# Patient Record
Sex: MALE | Race: WHITE | Employment: FULL TIME | URBAN - METROPOLITAN AREA
[De-identification: names, ages, dates, MRNs, and addresses within clinical notes are randomized per-mention and may not be internally consistent; named-entity substitution may affect disease eponyms.]

---

## 2017-02-20 ENCOUNTER — OFFICE VISIT (OUTPATIENT)
Dept: DERMATOLOGY | Facility: CLINIC | Age: 57
End: 2017-02-20

## 2017-02-20 VITALS — HEART RATE: 63 BPM | SYSTOLIC BLOOD PRESSURE: 130 MMHG | DIASTOLIC BLOOD PRESSURE: 82 MMHG

## 2017-02-20 DIAGNOSIS — Z13.0 SCREENING FOR DEFICIENCY ANEMIA: ICD-10-CM

## 2017-02-20 DIAGNOSIS — L30.0 NUMMULAR ECZEMA: Primary | ICD-10-CM

## 2017-02-20 DIAGNOSIS — Z11.59 NEED FOR HEPATITIS C SCREENING TEST: ICD-10-CM

## 2017-02-20 DIAGNOSIS — Z83.42 FAMILY HISTORY OF HYPERCHOLESTEROLEMIA: ICD-10-CM

## 2017-02-20 LAB
ANION GAP SERPL CALCULATED.3IONS-SCNC: 6 MMOL/L (ref 3–14)
BUN SERPL-MCNC: 17 MG/DL (ref 7–30)
CALCIUM SERPL-MCNC: 8.9 MG/DL (ref 8.5–10.1)
CHLORIDE SERPL-SCNC: 107 MMOL/L (ref 94–109)
CHOLEST SERPL-MCNC: 167 MG/DL
CO2 SERPL-SCNC: 27 MMOL/L (ref 20–32)
CREAT SERPL-MCNC: 0.84 MG/DL (ref 0.66–1.25)
ERYTHROCYTE [DISTWIDTH] IN BLOOD BY AUTOMATED COUNT: 13.2 % (ref 10–15)
GFR SERPL CREATININE-BSD FRML MDRD: NORMAL ML/MIN/1.7M2
GLUCOSE SERPL-MCNC: 86 MG/DL (ref 70–99)
HCT VFR BLD AUTO: 45.8 % (ref 40–53)
HCV AB SERPL QL IA: NORMAL
HDLC SERPL-MCNC: 40 MG/DL
HGB BLD-MCNC: 16.1 G/DL (ref 13.3–17.7)
LDLC SERPL CALC-MCNC: 112 MG/DL
MCH RBC QN AUTO: 34.5 PG (ref 26.5–33)
MCHC RBC AUTO-ENTMCNC: 35.2 G/DL (ref 31.5–36.5)
MCV RBC AUTO: 98 FL (ref 78–100)
NONHDLC SERPL-MCNC: 127 MG/DL
PLATELET # BLD AUTO: 226 10E9/L (ref 150–450)
POTASSIUM SERPL-SCNC: 4.5 MMOL/L (ref 3.4–5.3)
RBC # BLD AUTO: 4.66 10E12/L (ref 4.4–5.9)
SODIUM SERPL-SCNC: 141 MMOL/L (ref 133–144)
TRIGL SERPL-MCNC: 78 MG/DL
WBC # BLD AUTO: 6.6 10E9/L (ref 4–11)

## 2017-02-20 RX ORDER — CLOBETASOL PROPIONATE 0.5 MG/G
OINTMENT TOPICAL 2 TIMES DAILY
Qty: 180 G | Refills: 3 | Status: SHIPPED | OUTPATIENT
Start: 2017-02-20 | End: 2017-05-21

## 2017-02-20 ASSESSMENT — PAIN SCALES - GENERAL: PAINLEVEL: NO PAIN (0)

## 2017-02-20 NOTE — LETTER
2/20/2017       RE: Alvaro Partida  [address] [facility] [address]     Dear Colleague,    Thank you for referring your patient, Alvaro Partida, to the [facility] DERMATOLOGY at [facility]. Please see a copy of my visit note below.    Dermatology Problem list  1. Nummular dermatitis  2. Seborrheic keratoses  3. Lentigo      CHIEF COMPLAINT: Annual Skin Check.    SUBJECTIVE: Mr. Alvaro Partida is a 55 year old man who was [facility] for general skin examination on 2/8/16. Here today with his wife, Nona, for follow-up of nummular eczema and yearly skin check. States his eczema has been under good control, he has one active lesion today but states it is the only one he has had in some time. Currently using clobetasol cream once per week. Recently finished course of augmentin two weeks ago for sinusitis, otherwise feeling well. Denies chills, fever, recent weight loss. States he has been using sunblock over his scalp due to hair loss and presence of solar lentigo. He has no other areas of concern on his skin today.    OBJECTIVE:  On examination, the patient is a healthy middle aged man in no acute distress. He was alert and oriented x3. Complete skin examination was performed from scalp to toes including his scalp, face, eyes, lips, mouth, ears, neck, chest, abdomen, back, buttocks, and all 4 extremities. On the trunk and extremities, there were scattered benign appearing nevi and seborrheic keratoses. Scattered cherry hemangiomas over mid-back at level of the scapulae. Solar lentigo scattered over the vertex scalp and single focus over right cheek. Single 2-3 cm annular dermatitic erythematous plaque over medial right shin.      ASSESSMENT AND PLAN:  1. Nummular eczema.  - continue topical clobetasol as needed over involved areas (Patient uses sparingly and no evidence of atrophy)  - refill clobetasol.    2. Seborrheic keratoses. No  atypia    3. Benign appearing nevi on trunk and extremities without concerning features.    4. Lentigo of the scalp.  - continue to wear sunscreen on scalp and/or cover with hat    He will return in 1 year for repeat skin check.    Scribed by Kip Parra, MS4 for Dr. Retana    .The medical student acted as scribe for this encounter.  The encounter documented above was completely performed by myself.  Mary Lou Retana MD

## 2017-02-20 NOTE — MR AVS SNAPSHOT
After Visit Summary   2/20/2017    Alvaro Partida    MRN: [id number]           Patient Information     Date Of Birth          1960        Visit Information        Provider Department      2/20/2017 9:15 AM Mary Lou Retana MD [facility] Dermatology        Today's Diagnoses     Nummular eczema    -  1       Follow-ups after your visit        Follow-up notes from your care team     Return in about 1 year (around 2/20/2018).      Who to contact     Please call your clinic at [phone] to:    Ask questions about your health    Make or cancel appointments    Discuss your medicines    Learn about your test results    Speak to your doctor   If you have compliments or concerns about an experience at your clinic, or if you wish to file a complaint, please contact [facility] Patient Relations at [phone] or email us at [email or web address]         Additional Information About Your Visit        MyChart Information     [employer] gives you secure access to your electronic health record. If you see a primary care provider, you can also send messages to your care team and make appointments. If you have questions, please call your primary care clinic.  If you do not have a primary care provider, please call [phone] and they will assist you.      [employer] is an electronic gateway that provides easy, online access to your medical records. With [employer], you can request a clinic appointment, read your test results, renew a prescription or communicate with your care team.     To access your existing account, please contact your [facility] or call [phone] for assistance.        Care EveryWhere ID     This is your Care EveryWhere ID. This could be used by other organizations to access your [place] medical records  [id number]        Your Vitals Were     Pulse                   63            Blood Pressure from Last 3 Encounters:    02/20/17 130/82   11/08/16 110/75   08/20/13 129/84    Weight from Last 3 Encounters:   11/08/16 104.9 kg (231 lb 3.2 oz)   08/20/13 99.8 kg (220 lb)              Today, you had the following     No orders found for display         Today's Medication Changes          These changes are accurate as of: 2/20/17 12:56 PM.  If you have any questions, ask your nurse or doctor.               Start taking these medicines.        Dose/Directions    clobetasol 0.05 % ointment   Commonly known as:  TEMOVATE   Used for:  Nummular eczema   Started by:  Mary Lou Retana MD        Apply topically 2 times daily   Quantity:  180 g   Refills:  3            Where to get your medicines      These medications were sent to [facility] [address] [place] [address] [address]  AT [facility] [address] , [facility] [address]     Phone:  [phone]     clobetasol 0.05 % ointment                Primary Care Provider Office Phone # Fax #    Alex Mackenzie [phone]       [address] [address]        Thank you!     Thank you for choosing [facility] DERMATOLOGY  for your care. Our goal is always to provide you with excellent care. Hearing back from our patients is one way we can continue to improve our services. Please take a few minutes to complete the written survey that you may receive in the mail after your visit with us. Thank you!             Your Updated Medication List - Protect others around you: Learn how to safely use, store and throw away your medicines at www.disposemymeds.org.          This list is accurate as of: 2/20/17 12:56 PM.  Always use your most recent med list.                   Brand Name Dispense Instructions for use    ALEVE PO          ASPIRIN PO      Take 81 mg by mouth       clobetasol 0.05 % ointment    TEMOVATE    180 g    Apply topically 2 times daily       loratadine 10 MG tablet    CLARITIN     Take 10 mg by mouth daily       Multi-vitamin  Tabs tablet      Take 1 tablet by mouth daily       OMEGA-3 FISH OIL PO      Take 1 g by mouth daily

## 2017-02-20 NOTE — NURSING NOTE
Dermatology Rooming Note    Alvaro Partida's goals for this visit include:   Chief Complaint   Patient presents with     Skin Check     Skin check, Alvaro does not note any areas of concern     Jessica Campbell LPN

## 2017-02-20 NOTE — PROGRESS NOTES
Dermatology Problem list  1. Nummular dermatitis  2. Seborrheic keratoses  3. Lentigo      CHIEF COMPLAINT: Annual Skin Check.    SUBJECTIVE: Mr. Alvaro Partida is a 55 year old man who was last seen [facility] for general skin examination on 2/8/16. Here today with his wife, Nona, for follow-up of nummular eczema and yearly skin check. States his eczema has been under good control, he has one active lesion today but states it is the only one he has had in some time. Currently using clobetasol cream once per week. Recently finished course of augmentin two weeks ago for sinusitis, otherwise feeling well. Denies chills, fever, recent weight loss. States he has been using sunblock over his scalp due to hair loss and presence of solar lentigo. He has no other areas of concern on his skin today.    OBJECTIVE:  On examination, the patient is a healthy middle aged man in no acute distress. He was alert and oriented x3. Complete skin examination was performed from scalp to toes including his scalp, face, eyes, lips, mouth, ears, neck, chest, abdomen, back, buttocks, and all 4 extremities. On the trunk and extremities, there were scattered benign appearing nevi and seborrheic keratoses. Scattered cherry hemangiomas over mid-back at level of the scapulae. Solar lentigo scattered over the vertex scalp and single focus over right cheek. Single 2-3 cm annular dermatitic erythematous plaque over medial right shin.      ASSESSMENT AND PLAN:  1. Nummular eczema.  - continue topical clobetasol as needed over involved areas (Patient uses sparingly and no evidence of atrophy)  - refill clobetasol.    2. Seborrheic keratoses. No atypia    3. Benign appearing nevi on trunk and extremities without concerning features.    4. Lentigo of the scalp.  - continue to wear sunscreen on scalp and/or cover with hat    He will return in 1 year for repeat skin check.    Scribed by Kip Parra, MS4 for   [place]    .The medical student acted as scribe for this encounter.  The encounter documented above was completely performed by myself.  Mary Lou Retana MD

## 2017-11-12 ENCOUNTER — HEALTH MAINTENANCE LETTER (OUTPATIENT)
Age: 57
End: 2017-11-12

## 2018-03-05 ENCOUNTER — OFFICE VISIT (OUTPATIENT)
Dept: DERMATOLOGY | Facility: CLINIC | Age: 58
End: 2018-03-05
Payer: COMMERCIAL

## 2018-03-05 DIAGNOSIS — D48.5 NEOPLASM OF UNCERTAIN BEHAVIOR OF SKIN: ICD-10-CM

## 2018-03-05 DIAGNOSIS — L91.8 ACHROCHORDON: Primary | ICD-10-CM

## 2018-03-05 DIAGNOSIS — D18.01 CHERRY ANGIOMA: ICD-10-CM

## 2018-03-05 ASSESSMENT — PAIN SCALES - GENERAL
PAINLEVEL: NO PAIN (0)
PAINLEVEL: NO PAIN (0)

## 2018-03-05 NOTE — NURSING NOTE
Dermatology Rooming Note    Alvaro Partida's goals for this visit include:   Chief Complaint   Patient presents with     Derm Problem     Alvaro is here today for a skin check, states he has a mole on his penis that he's concerned about.       Mel Burton LPN

## 2018-03-05 NOTE — PATIENT INSTRUCTIONS

## 2018-03-05 NOTE — NURSING NOTE
Lidocaine-1 % injection   1mL once for one use, starting 3/5/2018 ending 3/5/2018,  2mL disp, R-0, injection  Injected by Dr. Thomson

## 2018-03-05 NOTE — LETTER
3/5/2018       RE: Alvaro Partida  [address] [facility] [address]     Dear Colleague,    Thank you for referring your patient, Alvaro Partida, to the [facility] DERMATOLOGY at [facility]. Please see a copy of my visit note below.    [facility] Dermatology Note      Dermatology Problem List:  1. Nummular dermatitis  2. Seborrheic keratoses  3. Lentigo  4. NUB, right anterior shoulder, r/o BCC biopsied 3/5/18 - results pending    Encounter Date: Mar 5, 2018    CC:   Chief Complaint   Patient presents with     Derm Problem     Alvaro is here today for a skin check, states he has a mole on his penis that he's concerned about.         History of Present Illness:  Mr. Alvaro Partida is a 57 year old male who presents as a follow-up for skin check. The patient was last seen 2/20/17 when skin exam was reassuring, he was given clobetasol PRN for nummular eczema which has remained well controlled. Today his only specific skin concern is regarding a brown bump on the dorsum of the shaft of his penis. Given his wife's history of melanoma they are very aware of any new spots that develop on eachother's skin. He thinks this bump has been present for the past few months. He denies any symptoms in relation to the lesion. He denies any other spots that are bleeding, tender or rapidly changing. No changes to his overall health. No other skin concerns today.    Past Medical History:   Patient Active Problem List   Diagnosis     Lichen simplex     Tubular adenoma of colon, 0.2cm [facility].     Seasonal allergic rhinitis due to pollen     Vasculogenic erectile dysfunction     Past Medical History:   Diagnosis Date     Elevated cholesterol      Seasonal allergies      Syncopal episodes 2006    EKG, CXR, CT, labs nl     Past Surgical History:   Procedure Laterality Date     SINUS SURGERY  5/2012    [place], [place]       Social History:  Is  .    Medications:  Current Outpatient Prescriptions   Medication Sig Dispense Refill     multivitamin, therapeutic with minerals (MULTI-VITAMIN) TABS Take 1 tablet by mouth daily       Omega-3 Fatty Acids (OMEGA-3 FISH OIL PO) Take 1 g by mouth daily       ASPIRIN PO Take 81 mg by mouth       loratadine (CLARITIN) 10 MG tablet Take 10 mg by mouth daily       Naproxen Sodium (ALEVE PO)           No Known Allergies      Review of Systems:  -As per HPI  -Constitutional: The patient denies fatigue, fevers, chills, unintended weight loss, and night sweats.  -HEENT: Patient denies nonhealing oral sores.  -Skin: As above in HPI. No additional skin concerns.    Physical exam:  Vitals: There were no vitals taken for this visit.  GEN: This is a well developed, well-nourished male in no acute distress, in a pleasant mood.    SKIN: Full skin, which includes the head/face, both arms, chest, back, abdomen,both legs, genitalia and/or groin buttocks, digits and/or nails, was examined.  - right anterior shoulder with a pink shiny papule with arborizing vessels under dermoscopy  - excoriated pink papulonodule on the right posterior arm  - on the dorsal surface of the shaft of the penis there is a medium brown pedunculated papule  - few well circumscribed medium brown evenly pigmented macules all <6mm on the torso and upper extremities  -No other lesions of concern on areas examined.     Impression/Plan:  1. Neoplasm of uncertain behavior on the right anterior shoulder. The differential diagnosis includes bcc.     Shave biopsy:  After discussion of benefits and risks including but not limited to bleeding/bruising, pain/swelling, infection, scar, incomplete removal, nerve damage/numbness, recurrence, and non-diagnostic biopsy, written consent, verbal consent and photographs were obtained. Time-out was performed. The area was cleaned with isopropyl alcohol.  was injected to obtain adequate anesthesia of the lesion on the right  anterior shouler. 2ml of 1% lidocaine was injected to obtain adequate anesthesia. A  shave biopsy was performed. Hemostasis was achieved with aluminium chloride. Vaseline and a sterile dressing were applied. The patient tolerated the procedure and no complications were noted. The patient was provided with verbal and written post care instructions.      Will call when results are available and to discuss next steps in treatment if necessary    2. Cherry angioma(s)    Reassured of benign etiology, no treatment indicated    3. Acrochordon, non irritated, shaft of penis    Reassured of benign etiology, no treatment indicated    4. Prurigo nodule, R posterior arm    Encouraged patient to not pick or scratch at this lesion, monitor for resolution    5. Benign nevi    Reviewed ABCDE's of melanoma and discussed sun protection      Follow-up in 1 year, earlier for new or changing lesions - sooner pending biopsy results      Dr. Retana staffed the patient.    Staff Involved:  Resident(Davin Thomson)/Staff(as above)  .I, Mary Lou Retana MD, saw this patient with the resident and agree with the resident s findings and plan of care as documented in the resident s note.  I was present for key portions of the procedure. KB            Pictures were placed in Pt's chart today for future reference.      Mary Lou Retana MD

## 2018-03-05 NOTE — PROGRESS NOTES
[facility] Dermatology Note      Dermatology Problem List:  1. Nummular dermatitis  2. Seborrheic keratoses  3. Lentigo  4. NUB, right anterior shoulder, r/o BCC biopsied 3/5/18 - results pending    Encounter Date: Mar 5, 2018    CC:   Chief Complaint   Patient presents with     Derm Problem     Alvaro is here today for a skin check, states he has a mole on his penis that he's concerned about.         History of Present Illness:  Mr. Alvaro Partida is a 57 year old male who presents as a follow-up for skin check. The patient was last seen 2/20/17 when skin exam was reassuring, he was given clobetasol PRN for nummular eczema which has remained well controlled. Today his only specific skin concern is regarding a brown bump on the dorsum of the shaft of his penis. Given his wife's history of melanoma they are very aware of any new spots that develop on eachother's skin. He thinks this bump has been present for the past few months. He denies any symptoms in relation to the lesion. He denies any other spots that are bleeding, tender or rapidly changing. No changes to his overall health. No other skin concerns today.    Past Medical History:   Patient Active Problem List   Diagnosis     Lichen simplex     Tubular adenoma of colon, 0.2cm [facility].     Seasonal allergic rhinitis due to pollen     Vasculogenic erectile dysfunction     Past Medical History:   Diagnosis Date     Elevated cholesterol      Seasonal allergies      Syncopal episodes 2006    EKG, CXR, CT, labs nl     Past Surgical History:   Procedure Laterality Date     SINUS SURGERY  5/2012    [place], [place]       Social History:  Is .    Medications:  Current Outpatient Prescriptions   Medication Sig Dispense Refill     multivitamin, therapeutic with minerals (MULTI-VITAMIN) TABS Take 1 tablet by mouth daily       Omega-3 Fatty Acids (OMEGA-3 FISH OIL PO) Take 1 g by mouth daily       ASPIRIN PO Take 81 mg by mouth        loratadine (CLARITIN) 10 MG tablet Take 10 mg by mouth daily       Naproxen Sodium (ALEVE PO)           No Known Allergies      Review of Systems:  -As per HPI  -Constitutional: The patient denies fatigue, fevers, chills, unintended weight loss, and night sweats.  -HEENT: Patient denies nonhealing oral sores.  -Skin: As above in HPI. No additional skin concerns.    Physical exam:  Vitals: There were no vitals taken for this visit.  GEN: This is a well developed, well-nourished male in no acute distress, in a pleasant mood.    SKIN: Full skin, which includes the head/face, both arms, chest, back, abdomen,both legs, genitalia and/or groin buttocks, digits and/or nails, was examined.  - right anterior shoulder with a pink shiny papule with arborizing vessels under dermoscopy  - excoriated pink papulonodule on the right posterior arm  - on the dorsal surface of the shaft of the penis there is a medium brown pedunculated papule  - few well circumscribed medium brown evenly pigmented macules all <6mm on the torso and upper extremities  -No other lesions of concern on areas examined.     Impression/Plan:  1. Neoplasm of uncertain behavior on the right anterior shoulder. The differential diagnosis includes bcc.     Shave biopsy:  After discussion of benefits and risks including but not limited to bleeding/bruising, pain/swelling, infection, scar, incomplete removal, nerve damage/numbness, recurrence, and non-diagnostic biopsy, written consent, verbal consent and photographs were obtained. Time-out was performed. The area was cleaned with isopropyl alcohol.  was injected to obtain adequate anesthesia of the lesion on the right anterior shouler. 2ml of 1% lidocaine was injected to obtain adequate anesthesia. A  shave biopsy was performed. Hemostasis was achieved with aluminium chloride. Vaseline and a sterile dressing were applied. The patient tolerated the procedure and no complications were noted. The patient was  provided with verbal and written post care instructions.      Will call when results are available and to discuss next steps in treatment if necessary    2. Cherry angioma(s)    Reassured of benign etiology, no treatment indicated    3. Acrochordon, non irritated, shaft of penis    Reassured of benign etiology, no treatment indicated    4. Prurigo nodule, R posterior arm    Encouraged patient to not pick or scratch at this lesion, monitor for resolution    5. Benign nevi    Reviewed ABCDE's of melanoma and discussed sun protection      Follow-up in 1 year, earlier for new or changing lesions - sooner pending biopsy results      Dr. Retana staffed the patient.    Staff Involved:  Resident(Davin Thomson)/Staff(as above)  .I, Mary Lou Retana MD, saw this patient with the resident and agree with the resident s findings and plan of care as documented in the resident s note.  I was present for key portions of the procedure. KB

## 2018-03-05 NOTE — MR AVS SNAPSHOT
After Visit Summary   3/5/2018    Alvaro Partida    MRN: [id number]           Patient Information     Date Of Birth          1960        Visit Information        Provider Department      3/5/2018 8:45 AM Mary Lou Retana MD [facility] Dermatology        Today's Diagnoses     Achrochordon    -  1    Cherry angioma        Neoplasm of uncertain behavior of skin          Care Instructions    Wound Care After a Biopsy    What is a skin biopsy?  A skin biopsy allows the doctor to examine a very small piece of tissue under the microscope to determine the diagnosis and the best treatment for the skin condition. A local anesthetic (numbing medicine)  is injected with a very small needle into the skin area to be tested. A small piece of skin is taken from the area. Sometimes a suture (stitch) is used.     What are the risks of a skin biopsy?  I will experience scar, bleeding, swelling, pain, crusting and redness. I may experience incomplete removal or recurrence. Risks of this procedure are excessive bleeding, bruising, infection, nerve damage, numbness, thick (hypertrophic or keloidal) scar and non-diagnostic biopsy.    How should I care for my wound for the first 24 hours?    Keep the wound dry and covered for 24 hours    If it bleeds, hold direct pressure on the area for 15 minutes. If bleeding does not stop then go to the emergency room    Avoid strenuous exercise the first 1-2 days or as your doctor instructs you    How should I care for the wound after 24 hours?    After 24 hours, remove the bandage    You may bathe or shower as normal    If you had a scalp biopsy, you can shampoo as usual and can use shower water to clean the biopsy site daily    Clean the wound twice a day with gentle soap and water    Do not scrub, be gentle    Apply white petroleum/Vaseline after cleaning the wound with a cotton swab or a clean finger, and keep the site covered with a Bandaid /bandage. Bandages are not  necessary with a scalp biopsy    If you are unable to cover the site with a Bandaid /bandage, re-apply ointment 2-3 times a day to keep the site moist. Moisture will help with healing    Avoid strenuous activity for first 1-2 days    Avoid lakes, rivers, pools, and oceans until the stitches are removed or the site is healed    How do I clean my wound?    Wash hands thoroughly with soap or use hand  before all wound care    Clean the wound with gentle soap and water    Apply white petroleum/Vaseline  to wound after it is clean    Replace the Bandaid /bandage to keep the wound covered for the first few days or as instructed by your doctor    If you had a scalp biopsy, warm shower water to the area on a daily basis should suffice    What should I use to clean my wound?     Cotton-tipped applicators (Qtips )    White petroleum jelly (Vaseline ). Use a clean new container and use Q-tips to apply.    Bandaids   as needed    Gentle soap     How should I care for my wound long term?    Do not get your wound dirty    Keep up with wound care for one week or until the area is healed.    A small scab will form and fall off by itself when the area is completely healed. The area will be red and will become pink in color as it heals. Sun protection is very important for how your scar will turn out. Sunscreen with an SPF 30 or greater is recommended once the area is healed.    You should have some soreness but it should be mild and slowly go away over several days. Talk to your doctor about using tylenol for pain,    When should I call my doctor?  If you have increased:     Pain or swelling    Pus or drainage (clear or slightly yellow drainage is ok)    Temperature over 100F    Spreading redness or warmth around wound    When will I hear about my results?  The biopsy results can take 2-3 weeks to come back. The clinic will call you with the results, send you a Vitrina message, or have you schedule a follow-up clinic or  phone time to discuss the results. Contact our clinics if you do not hear from us in 3 weeks.     Who should I call with questions?    [facility]: [phone]     [facility]: [phone]    For urgent needs outside of business hours call the [facility] at [phone] and ask for the dermatology resident on call              Follow-ups after your visit        Follow-up notes from your care team     Return in about 1 year (around 3/5/2019), or sooner pending biopsy results.      Who to contact     Please call your clinic at [phone] to:    Ask questions about your health    Make or cancel appointments    Discuss your medicines    Learn about your test results    Speak to your doctor            Additional Information About Your Visit        [employer] Information     [employer] gives you secure access to your electronic health record. If you see a primary care provider, you can also send messages to your care team and make appointments. If you have questions, please call your primary care clinic.  If you do not have a primary care provider, please call [phone] and they will assist you.      [employer] is an electronic gateway that provides easy, online access to your medical records. With [employer], you can request a clinic appointment, read your test results, renew a prescription or communicate with your care team.     To access your existing account, please contact your [facility] or call [phone] for assistance.        Care EveryWhere ID     This is your Care EveryWhere ID. This could be used by other organizations to access your [place] medical records  [id number]         Blood Pressure from Last 3 Encounters:   02/20/17 130/82   11/08/16 110/75   08/20/13 129/84    Weight from Last 3 Encounters:   11/08/16 104.9 kg (231 lb 3.2 oz)   08/20/13 99.8 kg (220 lb)              We Performed the Following      BIOPSY SKIN/SUBQ/MUC MEM, SINGLE LESION     Surgical pathology exam        Primary Care Provider Office Phone # Fax #    Alex Mackenzie [phone]       [facility] [address] [place] [address]        Equal Access to Services     ELLIE DOVER : Hadrocio hardin hadfabiolao Soomaali, waaxda luqadaha, qaybta kaalmada adeegyada, juan kapilin hayaan fortunatodeena zepeda katlyn weller. So [facility] [phone].    ATENCIÓN: Si habla español, tiene a jules disposición servicios gratuitos de asistencia lingüística. Llame al [phone].    We comply with applicable federal civil rights laws and [place] laws. We do not discriminate on the basis of race, color, national origin, age, disability, sex, sexual orientation, or gender identity.            Thank you!     Thank you for choosing [facility]ER[facility]  for your care. Our goal is always to provide you with excellent care. Hearing back from our patients is one way we can continue to improve our services. Please take a few minutes to complete the written survey that you may receive in the mail after your visit with us. Thank you!             Your Updated Medication List - Protect others around you: Learn how to safely use, store and throw away your medicines at www.disposemymeds.org.          This list is accurate as of 3/5/18  9:31 AM.  Always use your most recent med list.                   Brand Name Dispense Instructions for use Diagnosis    ALEVE PO           ASPIRIN PO      Take 81 mg by mouth        loratadine 10 MG tablet    CLARITIN     Take 10 mg by mouth daily        Multi-vitamin Tabs tablet      Take 1 tablet by mouth daily        OMEGA-3 FISH OIL PO      Take 1 g by mouth daily

## 2018-03-08 LAB — COPATH REPORT: NORMAL

## 2019-02-24 ENCOUNTER — DOCUMENTATION ONLY (OUTPATIENT)
Dept: CARE COORDINATION | Facility: CLINIC | Age: 59
End: 2019-02-24

## 2019-04-03 ENCOUNTER — OFFICE VISIT (OUTPATIENT)
Dept: DERMATOLOGY | Facility: CLINIC | Age: 59
End: 2019-04-03
Payer: COMMERCIAL

## 2019-04-03 DIAGNOSIS — L82.1 SK (SEBORRHEIC KERATOSIS): ICD-10-CM

## 2019-04-03 DIAGNOSIS — L57.0 AK (ACTINIC KERATOSIS): ICD-10-CM

## 2019-04-03 DIAGNOSIS — D48.5 NEOPLASM OF UNCERTAIN BEHAVIOR OF SKIN: Primary | ICD-10-CM

## 2019-04-03 RX ORDER — FLUTICASONE PROPIONATE 50 MCG
SPRAY, SUSPENSION (ML) NASAL
Refills: 1 | COMMUNITY
Start: 2018-10-26

## 2019-04-03 RX ORDER — CETIRIZINE HYDROCHLORIDE 10 MG/1
10 TABLET ORAL
COMMUNITY

## 2019-04-03 ASSESSMENT — PAIN SCALES - GENERAL: PAINLEVEL: NO PAIN (0)

## 2019-04-03 NOTE — PROGRESS NOTES
[facility] Dermatology Note      Dermatology Problem List:  1. Actinic Keratosis of left temporal region  -s/p cryotherapy 4/3/2019   2. Seborrheic keratoses  3. Lentigo  4. NUB, right anterior shoulder, r/o BCC biopsied 3/5/18 - benign, suggestive of ruptured folliculitis  5 NUB, left forearm biopsy 4/3/2019    Encounter Date: Apr 3, 2019    CC:  No chief complaint on file.      History of Present Illness:  Mr. Alvaro Partida is a 58 year old male who presents as a follow-up for a skin check. The patient was last seen 3/5/18 when an NUB on the right anterior shoulder was biopsied and returned benign, suggestive of ruptured folliculitis. He also was found to have an acrochordon on the shaft of his penis. Today, he has concern about two new lesions on his face and scalp. His wife, who is currently in treatment for stage 4 melanoma, is worried about some new brown patches on his neck. He has no family history of skin or pancreatic cancer. He uses sun protection including hats and sun screen regularly. He has otherwise been healthy for the past year and has no other skin concerns today.      Past Medical History:   Patient Active Problem List   Diagnosis     Lichen simplex     Tubular adenoma of colon, 0.2cm [facility].     Seasonal allergic rhinitis due to pollen     Vasculogenic erectile dysfunction     Past Medical History:   Diagnosis Date     Elevated cholesterol      Seasonal allergies      Syncopal episodes 2006    EKG, CXR, CT, labs nl     Past Surgical History:   Procedure Laterality Date     SINUS SURGERY  5/2012    [place], [place]       Social History:  Patient reports that  has never smoked. he has never used smokeless tobacco.    Family History:  Family History   Problem Relation Age of Onset     Alzheimer Disease Father      Breast Cancer Mother      Hypertension Mother      Diabetes Mother      Alzheimer Disease Mother 78     Sleep Apnea Mother      Sleep Apnea Sister       Sleep Apnea Sister      Sleep Apnea Brother      Cancer No family hx of         no family hx of skin cancers     Skin Cancer No family hx of      Melanoma No family hx of        Medications:  Current Outpatient Medications   Medication Sig Dispense Refill     ASPIRIN PO Take 81 mg by mouth       loratadine (CLARITIN) 10 MG tablet Take 10 mg by mouth daily       multivitamin, therapeutic with minerals (MULTI-VITAMIN) TABS Take 1 tablet by mouth daily       Naproxen Sodium (ALEVE PO)        Omega-3 Fatty Acids (OMEGA-3 FISH OIL PO) Take 1 g by mouth daily       No Known Allergies      Review of Systems:  -As per HPI  -Constitutional: Otherwise feeling well today, in usual state of health.  -Skin: As above in HPI. No additional skin concerns.    Physical exam:  Vitals: There were no vitals taken for this visit.  GEN: This is a well developed, well-nourished male in no acute distress, in a pleasant mood.    SKIN: Full skin, which includes the head/face, both arms, chest, back, abdomen,both legs, genitalia and/or groin buttocks, digits and/or nails, was examined.  - left forearm pigmented papule with irregular borders and coloring  - lentigines throughout the torso   - 2 red gritty papules on the left temporal region  - lentigo of the left temporal region and right cheek   - one scaly pink plaque on the right posterior ankle   - few well circumscribed medium brown evenly pigmented macules all <6mm on the torso and upper extremities  - multiple cherry angiomas on the torso  -seborrheic keratosis on the left neck, one on the back   -No other lesions of concern on areas examined.     Impression/Plan:  1. Actinic Keratosis x 2  of the left temporal region  - pre-cancerous potential of the lesion discussed    - Cryotherapy procedure note (performed by faculty): After verbal consent and discussion of risks and benefits including but no limited to dyspigmentation/scar, blister, infection, recurrence,left temporal area  was(were) treated with 1-2mm freeze border for 2 cycles with liquid nitrogen. Post cryotherapy instructions were provided.     2. Neoplasm of uncertain behavior on the left forearm. The differential diagnosis includes benign nevi vs. Melanoma.     - Shave biopsy(performed by faculty): After discussion of benefits and risks including but not limited to bleeding, infection, scar, incomplete removal, recurrence, and non-diagnostic biopsy, written consent and photographs were obtained. Time-out was performed. The area was cleaned with isopropyl alcohol. 5mL of 1% lidocaine with 1:100,000 epinephrine was injected to obtain adequate anesthesia of the lesion on the left forearm. A shave biopsy was performed. Hemostasis was achieved with aluminium chloride. Vaseline and a sterile dressing were applied. The patient tolerated the procedure and no complications were noted. The patient was provided with verbal and written post care instructions.   - Will call with biopsy results when available    3. Seborrheic keratosis    - Reassured of benign etiology, no treatment indicated    4. Psoriasis   - continue to use at home clobetasol as needed.     CC Referred Self, MD  No address on file on close of this encounter.  Follow-up in 1 year, earlier for new or changing lesions.     Staff Involved:  I, Murray Ch MS4, saw and examined the patient in the presence of Dr. Retana.    Murray Ch, MS4  [employer] [place] [employer]        .I was present with the medical student who participated in the service and in the documentation of the note. I have verified the history and personally performed the physical exam and medical decision making. I agree with the assessment and plan of care as documented in the note.  Mary Lou Retana MD

## 2019-04-03 NOTE — PATIENT INSTRUCTIONS
Cryotherapy    What is it?    Use of a very cold liquid, such as liquid nitrogen, to freeze and destroy abnormal skin cells that need to be removed    What should I expect?    Tenderness and redness    A small blister that might grow and fill with dark purple blood. There may be crusting.    More than one treatment may be needed if the lesions do not go away.    How do I care for the treated area?    Gently wash the area with your hands when bathing.    Use a thin layer of Vaseline to help with healing. You may use a Band-Aid.     The area should heal within 7-10 days and may leave behind a pink or lighter color.     Do not use an antibiotic or Neosporin ointment.     You may take acetaminophen (Tylenol) for pain.     Call your Doctor if you have:    Severe pain    Signs of infection (warmth, redness, cloudy yellow drainage, and or a bad smell)    Questions or concerns    Who should I call with questions?       [facility]: [phone]       [facility]: [phone]       For urgent needs outside of business hours call the [facility] at [phone]        and ask for the dermatology resident on call        Wound Care After a Biopsy    What is a skin biopsy?  A skin biopsy allows the doctor to examine a very small piece of tissue under the microscope to determine the diagnosis and the best treatment for the skin condition. A local anesthetic (numbing medicine)  is injected with a very small needle into the skin area to be tested. A small piece of skin is taken from the area. Sometimes a suture (stitch) is used.     What are the risks of a skin biopsy?  I will experience scar, bleeding, swelling, pain, crusting and redness. I may experience incomplete removal or recurrence. Risks of this procedure are excessive bleeding, bruising, infection, nerve damage, numbness, thick (hypertrophic or keloidal) scar and non-diagnostic biopsy.    How should I  care for my wound for the first 24 hours?    Keep the wound dry and covered for 24 hours    If it bleeds, hold direct pressure on the area for 15 minutes. If bleeding does not stop then go to the emergency room    Avoid strenuous exercise the first 1-2 days or as your doctor instructs you    How should I care for the wound after 24 hours?    After 24 hours, remove the bandage    You may bathe or shower as normal    If you had a scalp biopsy, you can shampoo as usual and can use shower water to clean the biopsy site daily    Clean the wound twice a day with gentle soap and water    Do not scrub, be gentle    Apply white petroleum/Vaseline after cleaning the wound with a cotton swab or a clean finger, and keep the site covered with a Bandaid /bandage. Bandages are not necessary with a scalp biopsy    If you are unable to cover the site with a Bandaid /bandage, re-apply ointment 2-3 times a day to keep the site moist. Moisture will help with healing    Avoid strenuous activity for first 1-2 days    Avoid lakes, rivers, pools, and oceans until the stitches are removed or the site is healed    How do I clean my wound?    Wash hands thoroughly with soap or use hand  before all wound care    Clean the wound with gentle soap and water    Apply white petroleum/Vaseline  to wound after it is clean    Replace the Bandaid /bandage to keep the wound covered for the first few days or as instructed by your doctor    If you had a scalp biopsy, warm shower water to the area on a daily basis should suffice    What should I use to clean my wound?     Cotton-tipped applicators (Qtips )    White petroleum jelly (Vaseline ). Use a clean new container and use Q-tips to apply.    Bandaids   as needed    Gentle soap     How should I care for my wound long term?    Do not get your wound dirty    Keep up with wound care for one week or until the area is healed.    A small scab will form and fall off by itself when the area is  completely healed. The area will be red and will become pink in color as it heals. Sun protection is very important for how your scar will turn out. Sunscreen with an SPF 30 or greater is recommended once the area is healed.    If you have stitches, stitches need to be removed in 14 days. You may return to our clinic for this or you may have it done locally at your doctor s office.    You should have some soreness but it should be mild and slowly go away over several days. Talk to your doctor about using tylenol for pain,    When should I call my doctor?  If you have increased:     Pain or swelling    Pus or drainage (clear or slightly yellow drainage is ok)    Temperature over 100F    Spreading redness or warmth around wound    When will I hear about my results?  The biopsy results can take 2-3 weeks to come back. The clinic will call you with the results, send you a [employer] message, or have you schedule a follow-up clinic or phone time to discuss the results. Contact our clinics if you do not hear from us in 3 weeks.     Who should I call with questions?    [facility]: [phone]     [facility]: [phone]    For urgent needs outside of business hours call the [facility] at [phone] and ask for the dermatology resident on call

## 2019-04-03 NOTE — LETTER
4/3/2019       RE: Alvaro Partida  [address] [facility] [address]     Dear Colleague,    Thank you for referring your patient, Alvaro Partida, to the [facility] DERMATOLOGY at [facility]. Please see a copy of my visit note below.    [facility] Dermatology Note      Dermatology Problem List:  1. Actinic Keratosis of left temporal region  -s/p cryotherapy 4/3/2019   2. Seborrheic keratoses  3. Lentigo  4. NUB, right anterior shoulder, r/o BCC biopsied 3/5/18 - benign, suggestive of ruptured folliculitis  5 NUB, left forearm biopsy 4/3/2019    Encounter Date: Apr 3, 2019    CC:  No chief complaint on file.      History of Present Illness:  Mr. Alvaro Partida is a 58 year old male who presents as a follow-up for  a skin check. The patient was last seen  3/5/18 when  an NUB on the right anterior shoulder was biopsied and returned benign, suggestive of ruptured folliculitis. He also was found to have an acrochordon on the shaft of his penis. Today, he has concern about two new lesions on his face and scalp. His wife, who is currently in treatment for stage 4 melanoma, is worried about some new brown patches on his neck. He has no family history of skin or pancreatic cancer. He uses sun protection including hats and sun screen regularly. He has otherwise been healthy for the past year and has no other skin concerns today.      Past Medical History:   Patient Active Problem List   Diagnosis     Lichen simplex     Tubular adenoma of colon, 0.2cm [facility].     Seasonal allergic rhinitis due to pollen     Vasculogenic erectile dysfunction     Past Medical History:   Diagnosis Date     Elevated cholesterol      Seasonal allergies      Syncopal episodes 2006    EKG, CXR, CT, labs nl     Past Surgical History:   Procedure Laterality Date     SINUS SURGERY  5/2012    [place], [place]       Social History:  Patient reports that  has never smoked. he has  never used smokeless tobacco.    Family History:  Family History   Problem Relation Age of Onset     Alzheimer Disease Father      Breast Cancer Mother      Hypertension Mother      Diabetes Mother      Alzheimer Disease Mother 78     Sleep Apnea Mother      Sleep Apnea Sister      Sleep Apnea Sister      Sleep Apnea Brother      Cancer No family hx of         no family hx of skin cancers     Skin Cancer No family hx of      Melanoma No family hx of        Medications:  Current Outpatient Medications   Medication Sig Dispense Refill     ASPIRIN PO Take 81 mg by mouth       loratadine (CLARITIN) 10 MG tablet Take 10 mg by mouth daily       multivitamin, therapeutic with minerals (MULTI-VITAMIN) TABS Take 1 tablet by mouth daily       Naproxen Sodium (ALEVE PO)        Omega-3 Fatty Acids (OMEGA-3 FISH OIL PO) Take 1 g by mouth daily       No Known Allergies      Review of Systems:  -As per HPI  -Constitutional: Otherwise feeling well today, in usual state of health.  -Skin: As above in HPI. No additional skin concerns.    Physical exam:  Vitals: There were no vitals taken for this visit.  GEN: This is a well developed, well-nourished male in no acute distress, in a pleasant mood.    SKIN: Full skin, which includes the head/face, both arms, chest, back, abdomen,both legs, genitalia and/or groin buttocks, digits and/or nails, was examined.  - left forearm pigmented papule with irregular borders and coloring  - lentigines throughout the torso   - 2 red gritty papules on the left temporal region  - lentigo of the left temporal region and right cheek   - one scaly pink plaque on the right posterior ankle   - few well circumscribed medium brown evenly pigmented macules all <6mm on the torso and upper extremities  - multiple cherry angiomas on the torso  -seborrheic keratosis on the left neck, one on the back   -No other lesions of concern on areas examined.     Impression/Plan:  1. Actinic Keratosis x 2  of the left  temporal region  - pre-cancerous potential of the lesion discussed    - Cryotherapy procedure note (performed by faculty): After verbal consent and discussion of risks and benefits including but no limited to dyspigmentation/scar, blister, infection, recurrence,left temporal area was(were) treated with 1-2mm freeze border for 2 cycles with liquid nitrogen. Post cryotherapy instructions were provided.     2. Neoplasm of uncertain behavior on the left forearm. The differential diagnosis includes benign nevi vs. Melanoma.     - Shave biopsy(performed by faculty): After discussion of benefits and risks including but not limited to bleeding, infection, scar, incomplete removal, recurrence, and non-diagnostic biopsy, written consent and photographs were obtained. Time-out was performed. The area was cleaned with isopropyl alcohol. 5mL of 1% lidocaine with 1:100,000 epinephrine was injected to obtain adequate anesthesia of the lesion on the left forearm. A shave biopsy was performed. Hemostasis was achieved with aluminium chloride. Vaseline and a sterile dressing were applied. The patient tolerated the procedure and no complications were noted. The patient was provided with verbal and written post care instructions.   - Will call with biopsy results when available    3. Seborrheic keratosis    - Reassured of benign etiology, no treatment indicated    4. Psoriasis   - continue to use at home clobetasol as needed.     CC Referred Self, MD  No address on file on close of this encounter.  Follow-up in 1 year, earlier for new or changing lesions.     Staff Involved:  I, Murray Ch MS4, saw and examined the patient in the presence of Dr. Retana.    Murray Ch, MS4  [employer]        .I was present with the medical student who participated in the service and in the documentation of the note. I have verified the history and personally performed the physical exam and medical decision making. I agree  with the assessment and plan of care as documented in the note.      Mary Lou Retana MD

## 2019-04-03 NOTE — NURSING NOTE
Dermatology Rooming Note    Alvaro Partida's goals for this visit include:   Chief Complaint   Patient presents with     Derm Problem     Don is here for a skin check, states concerning areas on his face, scalp, and neck.       Mel Burton LPN

## 2019-04-04 LAB — COPATH REPORT: NORMAL

## 2020-03-02 ENCOUNTER — HEALTH MAINTENANCE LETTER (OUTPATIENT)
Age: 60
End: 2020-03-02

## 2020-04-29 ENCOUNTER — TELEPHONE (OUTPATIENT)
Dept: DERMATOLOGY | Facility: CLINIC | Age: 60
End: 2020-04-29

## 2020-04-29 NOTE — TELEPHONE ENCOUNTER
Called and spoke with patient. He is agreeable to change visit to a telephone and photos have already been submitted. Patient verbalized understanding and has no further questions at this time.

## 2020-05-06 ENCOUNTER — OFFICE VISIT (OUTPATIENT)
Dept: DERMATOLOGY | Facility: CLINIC | Age: 60
End: 2020-05-06
Payer: COMMERCIAL

## 2020-05-06 DIAGNOSIS — D18.01 CHERRY ANGIOMA: ICD-10-CM

## 2020-05-06 DIAGNOSIS — D48.5 NEOPLASM OF UNCERTAIN BEHAVIOR OF SKIN: ICD-10-CM

## 2020-05-06 DIAGNOSIS — D48.9 NEOPLASM OF UNCERTAIN BEHAVIOR: ICD-10-CM

## 2020-05-06 DIAGNOSIS — L82.1 SK (SEBORRHEIC KERATOSIS): ICD-10-CM

## 2020-05-06 DIAGNOSIS — Q82.8 POROKERATOSIS: ICD-10-CM

## 2020-05-06 DIAGNOSIS — L57.0 AK (ACTINIC KERATOSIS): Primary | ICD-10-CM

## 2020-05-06 DIAGNOSIS — L82.1 SEBORRHEIC KERATOSES: ICD-10-CM

## 2020-05-06 RX ORDER — ROSUVASTATIN CALCIUM 5 MG/1
TABLET, COATED ORAL
COMMUNITY
Start: 2019-07-01

## 2020-05-06 ASSESSMENT — PAIN SCALES - GENERAL
PAINLEVEL: NO PAIN (0)
PAINLEVEL: NO PAIN (1)

## 2020-05-06 NOTE — PROGRESS NOTES
[facility] Dermatology Note      Dermatology Problem List:  1. Actinic Keratosis of left temporal region   -s/p cryotherapy 4/3/2019   2. Ruptured folliculitis, right anterior shoulder, biopsied 3/5/18  3. Jxnl nevus, left forearm, biopsy 4/3/2019  4. NUB, right dorsal arm, bx 5/6/20  Social: wife in treatment for stage 4 melanoma (dx 10 yrs ago)    Encounter Date: May 6, 2020    CC:  Chief Complaint   Patient presents with     Derm Problem     Don is here for a skin check, has concerning areas on his right forearm and right side of collar bone.        History of Present Illness:  Mr. Alvaro Partida is a 59 year old male who presents as a follow-up for a skin check. The patient was last seen 4/29/19 when an NUB on the left forearm was biopsied and returned as a junctional nevus. Today he has several lesions of concern including on the right forearm and right clavicle. These spots are asx. He notes that there is also an area on the L ankle that he applies clobetasol to. He has one last lesion on the right posterior arm that has been present for a year. His wife will squeeze it and it drains a fluid but never goes away. He cosme not have a h/o skin cancer. He does use sup protection regularly. Since his last visit his health has been stable. He has no other skin concerns today.     Past Medical History:   Patient Active Problem List   Diagnosis     Lichen simplex     Tubular adenoma of colon, 0.2cm [facility].     Seasonal allergic rhinitis due to pollen     Vasculogenic erectile dysfunction     Past Medical History:   Diagnosis Date     Elevated cholesterol      Seasonal allergies      Syncopal episodes 2006    EKG, CXR, CT, labs nl     Past Surgical History:   Procedure Laterality Date     SINUS SURGERY  5/2012    [place], [place]       Social History:  Patient reports that he has never smoked. He has never used smokeless tobacco.    Family History:  Family History   Problem Relation  Age of Onset     Alzheimer Disease Father      Breast Cancer Mother      Hypertension Mother      Diabetes Mother      Alzheimer Disease Mother 78     Sleep Apnea Mother      Sleep Apnea Sister      Sleep Apnea Sister      Sleep Apnea Brother      Cancer No family hx of         no family hx of skin cancers     Skin Cancer No family hx of      Melanoma No family hx of        Medications:  Current Outpatient Medications   Medication Sig Dispense Refill     ASPIRIN PO Take 81 mg by mouth       cetirizine (ZYRTEC) 10 MG tablet Take 10 mg by mouth       fluticasone (FLONASE) 50 MCG/ACT nasal spray   1     loratadine (CLARITIN) 10 MG tablet Take 10 mg by mouth daily       multivitamin, therapeutic with minerals (MULTI-VITAMIN) TABS Take 1 tablet by mouth daily       Naproxen Sodium (ALEVE PO)        Omega-3 Fatty Acids (OMEGA-3 FISH OIL PO) Take 1 g by mouth daily       No Known Allergies      Review of Systems:  -As per HPI  -Constitutional: Otherwise feeling well today, in usual state of health.  -Skin: As above in HPI. No additional skin concerns.    Physical exam:  Vitals: There were no vitals taken for this visit.  GEN: This is a well developed, well-nourished male in no acute distress, in a pleasant mood.    SKIN: Full skin, which includes the head/face, both arms, chest, back, abdomen,both legs, genitalia and/or groin buttocks, digits and/or nails, was examined.  - left and right forearms with symmetric tan stuck on appearing papules without pig network  - 4 pink gritty papules on the left scalp   - ~1cm light brown patch of the left temple  - single pink 2cm plaque with rim of scale and normal skin centrally on the right posterior ankle   - multiple domed red papules on the torso  - dark brown pedunculated 1mm papule on dorsal penile shaft  - 3mm pink papule with overlying white sale on the right dorsal arm  - No other lesions of concern on areas examined.     Impression/Plan:  1. Actinic Keratosis x 4  of the  left scalp  - pre-cancerous potential of the lesion discussed    - Cryotherapy procedure note (performed by faculty): After verbal consent and discussion of risks and benefits including but no limited to dyspigmentation/scar, blister, infection, recurrence,left temporal area was(were) treated with 1-2mm freeze border for 2 cycles with liquid nitrogen. Post cryotherapy instructions were provided.     2. NUB, right dorsal arm, bx 5/6/20, The differential diagnosis includes folliculitis vs EIC vs other     - Shave biopsy: After discussion of benefits and risks including but not limited to bleeding, infection, scar, incomplete removal, recurrence, and non-diagnostic biopsy, written consent and photographs were obtained. Time-out was performed. The area was cleaned with isopropyl alcohol. 5mL of 1% lidocaine with 1:100,000 epinephrine was injected to obtain adequate anesthesia of the lesion on the left forearm. A shave biopsy was performed. Hemostasis was achieved with aluminium chloride. Vaseline and a sterile dressing were applied. The patient tolerated the procedure and no complications were noted. The patient was provided with verbal and written post care instructions.   - Will call with biopsy results when available    3. Seborrheic keratosis and cherry angiomata  - Reassured of benign etiology, no treatment indicated    4. Possible porokeratosis of the L post ankle   - continue to use at home clobetasol as needed.     CC Referred Self, MD  No address on file on close of this encounter.    Follow-up in 1 year, earlier for new or changing lesions.     Staff Involved:  Resident (Isela Turner MD) / Staff (as above)  .I was present for key portions of the biopsy and the entire cryotherapy procedure. Mary Lou Retana MD  I, Mary Lou Retana MD, saw this patient with the resident and agree with the resident s findings and plan of care as documented in the resident s note.

## 2020-05-06 NOTE — PATIENT INSTRUCTIONS
Wound Care After a Biopsy    What is a skin biopsy?  A skin biopsy allows the doctor to examine a very small piece of tissue under the microscope to determine the diagnosis and the best treatment for the skin condition. A local anesthetic (numbing medicine)  is injected with a very small needle into the skin area to be tested. A small piece of skin is taken from the area. Sometimes a suture (stitch) is used.     What are the risks of a skin biopsy?  I will experience scar, bleeding, swelling, pain, crusting and redness. I may experience incomplete removal or recurrence. Risks of this procedure are excessive bleeding, bruising, infection, nerve damage, numbness, thick (hypertrophic or keloidal) scar and non-diagnostic biopsy.    How should I care for my wound for the first 24 hours?    Keep the wound dry and covered for 24 hours    If it bleeds, hold direct pressure on the area for 15 minutes. If bleeding does not stop then go to the emergency room    Avoid strenuous exercise the first 1-2 days or as your doctor instructs you    How should I care for the wound after 24 hours?    After 24 hours, remove the bandage    You may bathe or shower as normal    If you had a scalp biopsy, you can shampoo as usual and can use shower water to clean the biopsy site daily    Clean the wound twice a day with gentle soap and water    Do not scrub, be gentle    Apply white petroleum/Vaseline after cleaning the wound with a cotton swab or a clean finger, and keep the site covered with a Bandaid /bandage. Bandages are not necessary with a scalp biopsy    If you are unable to cover the site with a Bandaid /bandage, re-apply ointment 2-3 times a day to keep the site moist. Moisture will help with healing    Avoid strenuous activity for first 1-2 days    Avoid lakes, rivers, pools, and oceans until the stitches are removed or the site is healed    How do I clean my wound?    Wash hands thoroughly with soap or use hand  before all  wound care    Clean the wound with gentle soap and water    Apply white petroleum/Vaseline  to wound after it is clean    Replace the Bandaid /bandage to keep the wound covered for the first few days or as instructed by your doctor    If you had a scalp biopsy, warm shower water to the area on a daily basis should suffice    What should I use to clean my wound?     Cotton-tipped applicators (Qtips )    White petroleum jelly (Vaseline ). Use a clean new container and use Q-tips to apply.    Bandaids   as needed    Gentle soap     How should I care for my wound long term?    Do not get your wound dirty    Keep up with wound care for one week or until the area is healed.    A small scab will form and fall off by itself when the area is completely healed. The area will be red and will become pink in color as it heals. Sun protection is very important for how your scar will turn out. Sunscreen with an SPF 30 or greater is recommended once the area is healed.    If you have stitches, stitches need to be removed in 14 days. You may return to our clinic for this or you may have it done locally at your doctor s office.    You should have some soreness but it should be mild and slowly go away over several days. Talk to your doctor about using tylenol for pain,    When should I call my doctor?  If you have increased:     Pain or swelling    Pus or drainage (clear or slightly yellow drainage is ok)    Temperature over 100F    Spreading redness or warmth around wound    When will I hear about my results?  The biopsy results can take 2-3 weeks to come back. The clinic will call you with the results, send you a hopscoutt message, or have you schedule a follow-up clinic or phone time to discuss the results. Contact our clinics if you do not hear from us in 3 weeks.     Who should I call with questions?    [facility]: [phone]     [facility]: [phone]    For  urgent needs outside of business hours call the [facility] at [phone] and ask for the dermatology resident on call    Cryotherapy    What is it?    Use of a very cold liquid, such as liquid nitrogen, to freeze and destroy abnormal skin cells that need to be removed    What should I expect?    Tenderness and redness    A small blister that might grow and fill with dark purple blood. There may be crusting.    More than one treatment may be needed if the lesions do not go away.    How do I care for the treated area?    Gently wash the area with your hands when bathing.    Use a thin layer of Vaseline to help with healing. You may use a Band-Aid.     The area should heal within 7-10 days and may leave behind a pink or lighter color.     Do not use an antibiotic or Neosporin ointment.     You may take acetaminophen (Tylenol) for pain.     Call your Doctor if you have:    Severe pain    Signs of infection (warmth, redness, cloudy yellow drainage, and or a bad smell)    Questions or concerns    Who should I call with questions?       [facility]: [phone]       [facility]: [phone]       For urgent needs outside of business hours call the [facility] at [phone]        and ask for the dermatology resident on call

## 2020-05-06 NOTE — LETTER
5/6/2020       RE: Alvaro Partida  [address] [facility] [address]     Dear Colleague,    Thank you for referring your patient, Alvaro Partida, to the [facility] DERMATOLOGY at [facility]. Please see a copy of my visit note below.    [facility] Dermatology Note      Dermatology Problem List:  1. Actinic Keratosis of left temporal region   -s/p cryotherapy 4/3/2019   2. Ruptured folliculitis, right anterior shoulder, biopsied 3/5/18  3. Jxnl nevus, left forearm, biopsy 4/3/2019  4. NUB, right dorsal arm, bx 5/6/20  Social: wife in treatment for stage 4 melanoma (dx 10 yrs ago)    Encounter Date: May 6, 2020    CC:  Chief Complaint   Patient presents with     Derm Problem     Don is here for a skin check, has concerning areas on his right forearm and right side of collar bone.        History of Present Illness:  Mr. Alvaro Partida is a 59 year old male who presents as a follow-up for a skin check. The patient was last seen 4/29/19 when an NUB on the left forearm was biopsied and returned as a junctional nevus. Today he has several lesions of concern including on the right forearm and right clavicle. These spots are asx. He notes that there is also an area on the L ankle that he applies clobetasol to. He has one last lesion on the right posterior arm that has been present for a year. His wife will squeeze it and it drains a fluid but never goes away. He cosme not have a h/o skin cancer. He does use sup protection regularly. Since his last visit his health has been stable. He has no other skin concerns today.     Past Medical History:   Patient Active Problem List   Diagnosis     Lichen simplex     Tubular adenoma of colon, 0.2cm [facility].     Seasonal allergic rhinitis due to pollen     Vasculogenic erectile dysfunction     Past Medical History:   Diagnosis Date     Elevated cholesterol      Seasonal allergies      Syncopal episodes 2006     EKG, CXR, CT, labs nl     Past Surgical History:   Procedure Laterality Date     SINUS SURGERY  5/2012    [place], [place]       Social History:  Patient reports that he has never smoked. He has never used smokeless tobacco.    Family History:  Family History   Problem Relation Age of Onset     Alzheimer Disease Father      Breast Cancer Mother      Hypertension Mother      Diabetes Mother      Alzheimer Disease Mother 78     Sleep Apnea Mother      Sleep Apnea Sister      Sleep Apnea Sister      Sleep Apnea Brother      Cancer No family hx of         no family hx of skin cancers     Skin Cancer No family hx of      Melanoma No family hx of        Medications:  Current Outpatient Medications   Medication Sig Dispense Refill     ASPIRIN PO Take 81 mg by mouth       cetirizine (ZYRTEC) 10 MG tablet Take 10 mg by mouth       fluticasone (FLONASE) 50 MCG/ACT nasal spray   1     loratadine (CLARITIN) 10 MG tablet Take 10 mg by mouth daily       multivitamin, therapeutic with minerals (MULTI-VITAMIN) TABS Take 1 tablet by mouth daily       Naproxen Sodium (ALEVE PO)        Omega-3 Fatty Acids (OMEGA-3 FISH OIL PO) Take 1 g by mouth daily       No Known Allergies      Review of Systems:  -As per HPI  -Constitutional: Otherwise feeling well today, in usual state of health.  -Skin: As above in HPI. No additional skin concerns.    Physical exam:  Vitals: There were no vitals taken for this visit.  GEN: This is a well developed, well-nourished male in no acute distress, in a pleasant mood.    SKIN: Full skin, which includes the head/face, both arms, chest, back, abdomen,both legs, genitalia and/or groin buttocks, digits and/or nails, was examined.  - left and right forearms with symmetric tan stuck on appearing papules without pig network  - 4 pink gritty papules on the left scalp   - ~1cm light brown patch of the left temple  - single pink 2cm plaque with rim of scale and normal skin centrally on the right posterior ankle   -  multiple domed red papules on the torso  - dark brown pedunculated 1mm papule on dorsal penile shaft  - 3mm pink papule with overlying white sale on the right dorsal arm  - No other lesions of concern on areas examined.     Impression/Plan:  1. Actinic Keratosis x 4  of the left scalp  - pre-cancerous potential of the lesion discussed    - Cryotherapy procedure note (performed by faculty): After verbal consent and discussion of risks and benefits including but no limited to dyspigmentation/scar, blister, infection, recurrence,left temporal area was(were) treated with 1-2mm freeze border for 2 cycles with liquid nitrogen. Post cryotherapy instructions were provided.     2. NUB, right dorsal arm, bx 5/6/20, The differential diagnosis includes folliculitis vs EIC vs other     - Shave biopsy: After discussion of benefits and risks including but not limited to bleeding, infection, scar, incomplete removal, recurrence, and non-diagnostic biopsy, written consent and photographs were obtained. Time-out was performed. The area was cleaned with isopropyl alcohol. 5mL of 1% lidocaine with 1:100,000 epinephrine was injected to obtain adequate anesthesia of the lesion on the left forearm. A shave biopsy was performed. Hemostasis was achieved with aluminium chloride. Vaseline and a sterile dressing were applied. The patient tolerated the procedure and no complications were noted. The patient was provided with verbal and written post care instructions.   - Will call with biopsy results when available    3. Seborrheic keratosis and cherry angiomata  - Reassured of benign etiology, no treatment indicated    4. Possible porokeratosis of the L post ankle   - continue to use at home clobetasol as needed.     CC Referred Self, MD  No address on file on close of this encounter.    Follow-up in 1 year, earlier for new or changing lesions.     Staff Involved:  Resident (Isela Turner MD) / Staff (as above)  .I was present for key  portions of the biopsy and the entire cryotherapy procedure. Mary Lou GARCIA I, Mary Lou Retana MD, saw this patient with the resident and agree with the resident s findings and plan of care as documented in the resident s note.                Pictures were placed in Pt's chart today for future reference.      Again, thank you for allowing me to participate in the care of your patient.      Sincerely,    Mary Lou Retana MD

## 2020-05-06 NOTE — NURSING NOTE
Dermatology Rooming Note    Alvaro Partida's goals for this visit include:   Chief Complaint   Patient presents with     Derm Problem     Don is here for a skin check, has concerning areas on his right forearm and right side of collar bone.        Mel Burton LPN

## 2020-05-08 LAB — COPATH REPORT: NORMAL

## 2020-12-14 ENCOUNTER — HEALTH MAINTENANCE LETTER (OUTPATIENT)
Age: 60
End: 2020-12-14

## 2021-04-18 ENCOUNTER — HEALTH MAINTENANCE LETTER (OUTPATIENT)
Age: 61
End: 2021-04-18

## 2021-06-30 ENCOUNTER — OFFICE VISIT (OUTPATIENT)
Dept: DERMATOLOGY | Facility: CLINIC | Age: 61
End: 2021-06-30
Payer: COMMERCIAL

## 2021-06-30 DIAGNOSIS — L82.1 SK (SEBORRHEIC KERATOSIS): Primary | ICD-10-CM

## 2021-06-30 DIAGNOSIS — L82.0 INFLAMED SEBORRHEIC KERATOSIS: ICD-10-CM

## 2021-06-30 DIAGNOSIS — D18.01 CHERRY ANGIOMA: ICD-10-CM

## 2021-06-30 PROCEDURE — 17110 DESTRUCTION B9 LES UP TO 14: CPT | Performed by: DERMATOLOGY

## 2021-06-30 PROCEDURE — 99213 OFFICE O/P EST LOW 20 MIN: CPT | Mod: 25 | Performed by: DERMATOLOGY

## 2021-06-30 ASSESSMENT — PAIN SCALES - GENERAL: PAINLEVEL: NO PAIN (0)

## 2021-06-30 NOTE — PROGRESS NOTES
[facility] Dermatology Note      Dermatology Problem List:  1. Actinic Keratosis of left temporal region   -s/p cryotherapy 4/3/2019   2. Ruptured folliculitis, right anterior shoulder, biopsied 3/5/18  3. Jxnl nevus, left forearm, biopsy 4/3/2019  4. Dermatofibroma, right dorsal arm, bx 5/6/20   5. Seborrheic keratosis, irritated. On right forearm x3.    -s/p cryotherapy on 6/30/21  6. Possible porokeratosis of the L post ankle    - continue to use at home clobetasol as needed.   Social: wife in treatment for stage 4 melanoma (dx 10 yrs ago)    Encounter Date: Jun 30, 2021    CC:  Chief Complaint   Patient presents with     Derm Problem     Don, is here for a yearly skin check , spot on cheek, and right arm he has two spot        History of Present Illness:  Mr. Alvaro Partida is a 60 year old male who presents as a follow-up for a skin check.     The patient was last seen on 5/6/2020, where he had a biopsy of the right forearm revealing dermatofibroma.     Today, two spots of concern on his right arm which seem to fluctuate in color. They are asymptomatic. He also reports an 'age spot' on his left cheek; it will change in color if he is out in the sun. He also describes a 'white head' on his left cheek which will periodically appear. No lesions are bleeding, itching, or burning. No lesions are growing in size or shape. He notes that there is also an area on the L ankle that he applies clobetasol to as needed. He does not have a h/o skin cancer. He does use sun protection regularly. He has no other skin concerns today.     Past Medical History:   Patient Active Problem List   Diagnosis     Lichen simplex     Tubular adenoma of colon, 0.2cm [facility].     Seasonal allergic rhinitis due to pollen     Vasculogenic erectile dysfunction     Past Medical History:   Diagnosis Date     Elevated cholesterol      Seasonal allergies      Syncopal episodes 2006    EKG, CXR, CT, labs nl      Past Surgical History:   Procedure Laterality Date     SINUS SURGERY  5/2012    [place], [place]       Social History:  Patient reports that he has never smoked. He has never used smokeless tobacco.    Family History:  Family History   Problem Relation Age of Onset     Alzheimer Disease Father      Breast Cancer Mother      Hypertension Mother      Diabetes Mother      Alzheimer Disease Mother 78     Sleep Apnea Mother      Sleep Apnea Sister      Sleep Apnea Sister      Sleep Apnea Brother      Cancer No family hx of         no family hx of skin cancers     Skin Cancer No family hx of      Melanoma No family hx of        Medications:  Current Outpatient Medications   Medication Sig Dispense Refill     ASPIRIN PO Take 81 mg by mouth       cetirizine (ZYRTEC) 10 MG tablet Take 10 mg by mouth       fluticasone (FLONASE) 50 MCG/ACT nasal spray   1     loratadine (CLARITIN) 10 MG tablet Take 10 mg by mouth daily       multivitamin, therapeutic with minerals (MULTI-VITAMIN) TABS Take 1 tablet by mouth daily       Naproxen Sodium (ALEVE PO)        Omega-3 Fatty Acids (OMEGA-3 FISH OIL PO) Take 1 g by mouth daily       rosuvastatin (CRESTOR) 5 MG tablet        No Known Allergies      Review of Systems:  -As per HPI  -Constitutional: Otherwise feeling well today, in usual state of health.  -Skin: As above in HPI. No additional skin concerns.    Physical exam:  Vitals: There were no vitals taken for this visit.  GEN: This is a well developed, well-nourished male in no acute distress, in a pleasant mood.    SKIN: Full skin, which includes the head/face, both arms, chest, back, abdomen,both legs, genitalia and/or groin buttocks, digits and/or nails, was examined.  - right forearms with symmetric pink to tan stuck on appearing papules   - 4 pink gritty papules on the left scalp   - ~1cm light brown patch of the left temple  - single pink 2cm plaque with rim of scale and normal skin centrally on the right posterior ankle   -  multiple 1-2mm bright red papules on torso  - 1mm skin colored papules with dilated central pore on left cheek  - No other lesions of concern on areas examined.     Impression/Plan:    # Seborrheic keratosis, irritated/inflamed. On right forearm x3.   - Cryotherapy performed today (see procedure note(s) below).    # Solar lentigo, left cheek. Explained to patient benign nature of lesion.   - No treatment is necessary at this time unless the lesion changes or becomes symptomatic.     # Benign lesions: Multiple solar lentigos, seborrheic keratoses, cherry angiomas. Explained to patient benign nature of lesion.   - No treatment is necessary at this time unless the lesion changes or becomes symptomatic.     # Possible porokeratosis of the L post ankle   - continue to use at home clobetasol as needed for inflamation.     Procedures Performed:   - Cryotherapy procedure note, location(s): right forearm x3. After verbal consent and discussion of risks and benefits including, but not limited to, dyspigmentation/scar, blister, and pain, 3 lesion(s) was(were) treated with 1-2 mm freeze border for 1-2 cycles with liquid nitrogen. Post cryotherapy instructions were provided.  - Procedure(s) performed by faculty with medical student participation.     Follow-up in 1 year, earlier for new or changing lesions.     Staff Involved:  Medical Student / Staff (as above)    I saw and examined this patient in the presence of Dr. Retana.    [place], MS4  [facility]  I was present with the medical student who participated in the service and in the documentation of the note. I have verified the history and personally performed the physical exam and medical decision making. I agree with the assessment and plan of care as documented in the note.  Mary Lou Retana MD

## 2021-06-30 NOTE — NURSING NOTE
Chief Complaint   Patient presents with     Derm Problem     Don, is here for a yearly skin check , spot on cheek, and right arm he has two spot     Beto Ortiz EMT

## 2021-06-30 NOTE — PATIENT INSTRUCTIONS
Cryotherapy    What is it?    Use of a very cold liquid, such as liquid nitrogen, to freeze and destroy abnormal skin cells that need to be removed    What should I expect?    Tenderness and redness    A small blister that might grow and fill with dark purple blood. There may be crusting.    More than one treatment may be needed if the lesions do not go away.    How do I care for the treated area?    Gently wash the area with your hands when bathing.    Use a thin layer of Vaseline to help with healing. You may use a Band-Aid.     The area should heal within 7-10 days and may leave behind a pink or lighter color.     Do not use an antibiotic or Neosporin ointment.     You may take acetaminophen (Tylenol) for pain.     Call your Doctor if you have:    Severe pain    Signs of infection (warmth, redness, cloudy yellow drainage, and or a bad smell)    Questions or concerns    Who should I call with questions?       [facility]: [phone]       [facility]: [phone]       For urgent needs outside of business hours call the [facility] at [phone]        and ask for the dermatology resident on call

## 2021-06-30 NOTE — LETTER
6/30/2021       RE: Alvaro Partida  [address] [place] [facility] [address]     Dear Colleague,    Thank you for referring your patient, Alvaro Partida, to the [facility] [place] at [facility]. Please see a copy of my visit note below.    [facility] Dermatology Note      Dermatology Problem List:  1. Actinic Keratosis of left temporal region   -s/p cryotherapy 4/3/2019   2. Ruptured folliculitis, right anterior shoulder, biopsied 3/5/18  3. Jxnl nevus, left forearm, biopsy 4/3/2019  4. Dermatofibroma, right dorsal arm, bx 5/6/20   5. Seborrheic keratosis, irritated. On right forearm x3.    -s/p cryotherapy on 6/30/21  6. Possible porokeratosis of the L post ankle    - continue to use at home clobetasol as needed.   Social: wife in treatment for stage 4 melanoma (dx 10 yrs ago)    Encounter Date: Jun 30, 2021    CC:  Chief Complaint   Patient presents with     Derm Problem     Don, is here for a yearly skin check , spot on cheek, and right arm he has two spot        History of Present Illness:  Mr. Alvaro Partida is a 60 year old male who presents as a follow-up for a skin check.     The patient was last seen on 5/6/2020, where he had a biopsy of the right forearm revealing dermatofibroma.     Today, two spots of concern on his right arm which seem to fluctuate in color. They are asymptomatic. He also reports an 'age spot' on his left cheek; it will change in color if he is out in the sun. He also describes a 'white head' on his left cheek which will periodically appear. No lesions are bleeding, itching, or burning. No lesions are growing in size or shape. He notes that there is also an area on the L ankle that he applies clobetasol to as needed. He does not have a h/o skin cancer. He does use sun protection regularly. He has no other skin concerns today.     Past Medical History:   Patient Active Problem List   Diagnosis      Lichen simplex     Tubular adenoma of colon, 0.2cm [facility].     Seasonal allergic rhinitis due to pollen     Vasculogenic erectile dysfunction     Past Medical History:   Diagnosis Date     Elevated cholesterol      Seasonal allergies      Syncopal episodes 2006    EKG, CXR, CT, labs nl     Past Surgical History:   Procedure Laterality Date     SINUS SURGERY  5/2012    [place], [place]       Social History:  Patient reports that he has never smoked. He has never used smokeless tobacco.    Family History:  Family History   Problem Relation Age of Onset     Alzheimer Disease Father      Breast Cancer Mother      Hypertension Mother      Diabetes Mother      Alzheimer Disease Mother 78     Sleep Apnea Mother      Sleep Apnea Sister      Sleep Apnea Sister      Sleep Apnea Brother      Cancer No family hx of         no family hx of skin cancers     Skin Cancer No family hx of      Melanoma No family hx of        Medications:  Current Outpatient Medications   Medication Sig Dispense Refill     ASPIRIN PO Take 81 mg by mouth       cetirizine (ZYRTEC) 10 MG tablet Take 10 mg by mouth       fluticasone (FLONASE) 50 MCG/ACT nasal spray   1     loratadine (CLARITIN) 10 MG tablet Take 10 mg by mouth daily       multivitamin, therapeutic with minerals (MULTI-VITAMIN) TABS Take 1 tablet by mouth daily       Naproxen Sodium (ALEVE PO)        Omega-3 Fatty Acids (OMEGA-3 FISH OIL PO) Take 1 g by mouth daily       rosuvastatin (CRESTOR) 5 MG tablet        No Known Allergies      Review of Systems:  -As per HPI  -Constitutional: Otherwise feeling well today, in usual state of health.  -Skin: As above in HPI. No additional skin concerns.    Physical exam:  Vitals: There were no vitals taken for this visit.  GEN: This is a well developed, well-nourished male in no acute distress, in a pleasant mood.    SKIN: Full skin, which includes the head/face, both arms, chest, back, abdomen,both legs, genitalia and/or groin  buttocks, digits and/or nails, was examined.  - right forearms with symmetric pink to tan stuck on appearing papules   - 4 pink gritty papules on the left scalp   - ~1cm light brown patch of the left temple  - single pink 2cm plaque with rim of scale and normal skin centrally on the right posterior ankle   - multiple 1-2mm bright red papules on torso  - 1mm skin colored papules with dilated central pore on left cheek  - No other lesions of concern on areas examined.     Impression/Plan:    # Seborrheic keratosis, irritated/inflamed. On right forearm x3.   - Cryotherapy performed today (see procedure note(s) below).    # Solar lentigo, left cheek. Explained to patient benign nature of lesion.   - No treatment is necessary at this time unless the lesion changes or becomes symptomatic.     # Benign lesions: Multiple solar lentigos, seborrheic keratoses, cherry angiomas. Explained to patient benign nature of lesion.   - No treatment is necessary at this time unless the lesion changes or becomes symptomatic.     # Possible porokeratosis of the L post ankle   - continue to use at home clobetasol as needed for inflamation.     Procedures Performed:   - Cryotherapy procedure note, location(s): right forearm x3. After verbal consent and discussion of risks and benefits including, but not limited to, dyspigmentation/scar, blister, and pain, 3 lesion(s) was(were) treated with 1-2 mm freeze border for 1-2 cycles with liquid nitrogen. Post cryotherapy instructions were provided.  - Procedure(s) performed by faculty with medical student participation.     Follow-up in 1 year, earlier for new or changing lesions.     Staff Involved:  Medical Student / Staff (as above)    I saw and examined this patient in the presence of Dr. Retana.    [place] Ashley, MS4  [facility]  I was present with the medical student who participated in the service and in the documentation of the note. I have verified the history and personally  performed the physical exam and medical decision making. I agree with the assessment and plan of care as documented in the note.  Mary Lou Retana MD

## 2021-10-02 ENCOUNTER — HEALTH MAINTENANCE LETTER (OUTPATIENT)
Age: 61
End: 2021-10-02

## 2022-05-09 ENCOUNTER — OFFICE VISIT (OUTPATIENT)
Dept: DERMATOLOGY | Facility: CLINIC | Age: 62
End: 2022-05-09
Payer: COMMERCIAL

## 2022-05-09 DIAGNOSIS — L57.0 AK (ACTINIC KERATOSIS): ICD-10-CM

## 2022-05-09 DIAGNOSIS — L81.4 LENTIGO: ICD-10-CM

## 2022-05-09 DIAGNOSIS — L82.1 SK (SEBORRHEIC KERATOSIS): Primary | ICD-10-CM

## 2022-05-09 PROCEDURE — 17003 DESTRUCT PREMALG LES 2-14: CPT | Performed by: DERMATOLOGY

## 2022-05-09 PROCEDURE — 17000 DESTRUCT PREMALG LESION: CPT | Performed by: DERMATOLOGY

## 2022-05-09 PROCEDURE — 99213 OFFICE O/P EST LOW 20 MIN: CPT | Mod: 25 | Performed by: DERMATOLOGY

## 2022-05-09 ASSESSMENT — PAIN SCALES - GENERAL: PAINLEVEL: NO PAIN (0)

## 2022-05-09 NOTE — PROGRESS NOTES
[facility] Dermatology Note  Encounter Date: May 9, 2022  Office Visit     Dermatology Problem List:    1. Actinic Keratosis of left temporal region              -s/p cryotherapy 4/3/2019   2. Ruptured folliculitis, right anterior shoulder, biopsied 3/5/18  3. Jxnl nevus, left forearm, biopsy 4/3/2019  4. Dermatofibroma, right dorsal arm, bx 5/6/20   5. Seborrheic keratosis, irritated. On right forearm x3.               -s/p cryotherapy on 6/30/21  6. Possible porokeratosis of the L post ankle               - continue to use at home clobetasol as needed.   Social: wife in treatment for stage 4 melanoma (dx 10 yrs ago)    ____________________________________________    Assessment & Plan:     # AK left cheek and rght ear helix   - Cryotherapy performed today (see procedure note(s) below).   - continue good sun protection     # Hemorrhage left toenail- noted about a month ago. Now lightened in color   - Recommend follow for growth out.  Patient will send message if still present at end of summer    # Benign findings: Lentigo, seborrheic keratoses, nevi, porokeratosis, dermatofibroma   - no atypia     Procedures Performed:   - Cryotherapy procedure note, location(s): left cheek and right ear helix. After verbal consent and discussion of risks and benefits including, but not limited to, dyspigmentation/scar, blister, and pain, 2 lesion(s) was(were) treated with 1-2 mm freeze border for 1-2 cycles with liquid nitrogen. Post cryotherapy instructions were provided.      Follow-up: 1 year(s) in-person, or earlier for new or changing lesions    Staff:     Mary Lou Retana MD  ____________________________________________    CC: Skin Check (Don is here today for an annual skin check. Reports areas of of concern on forehead, head and left toe.)    HPI:  Mr. Alvaro Partida is a(n) 61 year old male who presents today as a return patient for a skin check.  Notes some scaly areas.  No tender or bleeding lesions.   Had probable trauma to left toenail and now has lightened in color over the past month.  With wife history of melanoma, he would like evaluation.    Patient is otherwise feeling well, without additional skin concerns.     Labs Reviewed:  N/A    Physical Exam:  Vitals: There were no vitals taken for this visit.  SKIN: Total skin excluding the undergarment areas but including the buttocks was performed. The exam included the head/face, neck, both arms, chest, back, abdomen, both legs, digits and/or nails.   - red gritty papules left cheek and right ear helix  - Scattered brown macules on sun exposed areas..   - There is a firm tan/flesh colored papule that dimples with lateral pressure on the legs..   - There are waxy stuck on tan to brown papules on the exam  - left toenail with hemorrhage below nail plate on dermoscopy  - porokeratosis right lower leg..   - No other lesions of concern on areas examined.     Medications:  Current Outpatient Medications   Medication     cetirizine (ZYRTEC) 10 MG tablet     fluticasone (FLONASE) 50 MCG/ACT nasal spray     Naproxen Sodium (ALEVE PO)     rosuvastatin (CRESTOR) 5 MG tablet     ASPIRIN PO     loratadine (CLARITIN) 10 MG tablet     multivitamin w/minerals (THERA-VIT-M) tablet     Omega-3 Fatty Acids (OMEGA-3 FISH OIL PO)     No current facility-administered medications for this visit.      Past Medical History:   Patient Active Problem List   Diagnosis     Lichen simplex     Tubular adenoma of colon, 0.2cm [facility].     Seasonal allergic rhinitis due to pollen     Vasculogenic erectile dysfunction     Past Medical History:   Diagnosis Date     Elevated cholesterol      Seasonal allergies      Syncopal episodes 2006    EKG, CXR, CT, labs nl       CC No referring provider defined for this encounter. on close of this encounter.

## 2022-05-09 NOTE — NURSING NOTE
Dermatology Rooming Note    Alvaro Partida's goals for this visit include:   Chief Complaint   Patient presents with     Skin Check     Don is here today for an annual skin check. Reports areas of of concern on forehead, head and left toe.     Brenda Ruiz, Facilitator

## 2022-05-09 NOTE — PATIENT INSTRUCTIONS
Cryotherapy    What is it?  Use of a very cold liquid, such as liquid nitrogen, to freeze and destroy abnormal skin cells that need to be removed    What should I expect?  Tenderness and redness  A small blister that might grow and fill with dark purple blood. There may be crusting.  More than one treatment may be needed if the lesions do not go away.    How do I care for the treated area?  Gently wash the area with your hands when bathing.  Use a thin layer of Vaseline to help with healing. You may use a Band-Aid.   The area should heal within 7-10 days and may leave behind a pink or lighter color.   Do not use an antibiotic or Neosporin ointment.   You may take acetaminophen (Tylenol) for pain.     Call your doctor if you have:  Severe pain  Signs of infection (warmth, redness, cloudy yellow drainage, and or a bad smell)  Questions or concerns    Who should I call with questions?      [facility]: [phone]      [facility]: [phone]      For urgent needs outside of business hours call the [facility] at [phone] and ask for the dermatology resident on call

## 2022-05-09 NOTE — LETTER
5/9/2022       RE: Alvaro Partida  [address]  [place] [address]     Dear Colleague,    Thank you for referring your patient, Alvaro Partida, to the [facility] [place] at [facility]. Please see a copy of my visit note below.    [facility] Dermatology Note  Encounter Date: May 9, 2022  Office Visit     Dermatology Problem List:    1. Actinic Keratosis of left temporal region              -s/p cryotherapy 4/3/2019   2. Ruptured folliculitis, right anterior shoulder, biopsied 3/5/18  3. Jxnl nevus, left forearm, biopsy 4/3/2019  4. Dermatofibroma, right dorsal arm, bx 5/6/20   5. Seborrheic keratosis, irritated. On right forearm x3.               -s/p cryotherapy on 6/30/21  6. Possible porokeratosis of the L post ankle               - continue to use at home clobetasol as needed.   Social: wife in treatment for stage 4 melanoma (dx 10 yrs ago)    ____________________________________________    Assessment & Plan:     # AK left cheek and rght ear helix   - Cryotherapy performed today (see procedure note(s) below).   - continue good sun protection     # Hemorrhage left toenail- noted about a month ago. Now lightened in color   - Recommend follow for growth out.  Patient will send message if still present at end of summer    # Benign findings: Lentigo, seborrheic keratoses, nevi, porokeratosis, dermatofibroma   - no atypia     Procedures Performed:   - Cryotherapy procedure note, location(s): left cheek and right ear helix. After verbal consent and discussion of risks and benefits including, but not limited to, dyspigmentation/scar, blister, and pain, 2 lesion(s) was(were) treated with 1-2 mm freeze border for 1-2 cycles with liquid nitrogen. Post cryotherapy instructions were provided.      Follow-up: 1 year(s) in-person, or earlier for new or changing lesions    Staff:     Mary Lou Retana  MD  ____________________________________________    CC: Skin Check (Henrry is here today for an annual skin check. Reports areas of of concern on forehead, head and left toe.)    HPI:  Mr. Alvaro Partida is a(n) 61 year old male who presents today as a return patient for a skin check.  Notes some scaly areas.  No tender or bleeding lesions.  Had probable trauma to left toenail and now has lightened in color over the past month.  With wife history of melanoma, he would like evaluation.    Patient is otherwise feeling well, without additional skin concerns.     Labs Reviewed:  N/A    Physical Exam:  Vitals: There were no vitals taken for this visit.  SKIN: Total skin excluding the undergarment areas but including the buttocks was performed. The exam included the head/face, neck, both arms, chest, back, abdomen, both legs, digits and/or nails.   - red gritty papules left cheek and right ear helix  - Scattered brown macules on sun exposed areas..   - There is a firm tan/flesh colored papule that dimples with lateral pressure on the legs..   - There are waxy stuck on tan to brown papules on the exam  - left toenail with hemorrhage below nail plate on dermoscopy  - porokeratosis right lower leg..   - No other lesions of concern on areas examined.     Medications:  Current Outpatient Medications   Medication     cetirizine (ZYRTEC) 10 MG tablet     fluticasone (FLONASE) 50 MCG/ACT nasal spray     Naproxen Sodium (ALEVE PO)     rosuvastatin (CRESTOR) 5 MG tablet     ASPIRIN PO     loratadine (CLARITIN) 10 MG tablet     multivitamin w/minerals (THERA-VIT-M) tablet     Omega-3 Fatty Acids (OMEGA-3 FISH OIL PO)     No current facility-administered medications for this visit.      Past Medical History:   Patient Active Problem List   Diagnosis     Lichen simplex     Tubular adenoma of colon, 0.2cm [facility].     Seasonal allergic rhinitis due to pollen     Vasculogenic erectile dysfunction     Past Medical History:    Diagnosis Date     Elevated cholesterol      Seasonal allergies      Syncopal episodes 2006    EKG, CXR, CT, labs nl       CC No referring provider defined for this encounter. on close of this encounter.

## 2022-05-14 ENCOUNTER — HEALTH MAINTENANCE LETTER (OUTPATIENT)
Age: 62
End: 2022-05-14

## 2022-08-10 ENCOUNTER — MYC MEDICAL ADVICE (OUTPATIENT)
Dept: DERMATOLOGY | Facility: CLINIC | Age: 62
End: 2022-08-10

## 2022-08-10 ENCOUNTER — TELEPHONE (OUTPATIENT)
Dept: DERMATOLOGY | Facility: CLINIC | Age: 62
End: 2022-08-10

## 2022-08-10 NOTE — TELEPHONE ENCOUNTER
[facility] Call Center    Phone Message    May a detailed message be left on voicemail: yes     Reason for Call: Symptoms or Concerns     If patient has red-flag symptoms, warm transfer to triage line    Current symptom or concern: A spot lesion no head and raised like a pimple or bug bite with no enter point. And now another spot and they are both are on the same leg.     Symptoms have been present for:  2 week(s) for the first and the second was today 08/10/22    Has patient previously been seen for this? No    By : NA    Date: NA    Are there any new or worsening symptoms? Yes: It seem to be getting worse and may be spreading.  Pt does have photos if she would like to look at.  Please call pt to discuss. Thanks     Action Taken: Message routed to:  [facility] Derm      Travel Screening: Not Applicable

## 2022-08-10 NOTE — TELEPHONE ENCOUNTER
"Called patient back. Patient stated that he has a couple raised lesions on his R leg that have been \"cauterized by a GP before\" and now have grown or \"seem to be spreading\". Asked patient to send photos on Cymbet and he agreed to do so. Will route to Dr. Retana as markus  "

## 2022-08-11 NOTE — TELEPHONE ENCOUNTER
Mary Lou Retana MD  [facility] 1 hour ago (8:55 AM)     KB    Please help the patient to schedule an appointment    Message text

## 2022-08-23 ENCOUNTER — OFFICE VISIT (OUTPATIENT)
Dept: DERMATOLOGY | Facility: CLINIC | Age: 62
End: 2022-08-23
Payer: COMMERCIAL

## 2022-08-23 DIAGNOSIS — L82.0 INFLAMED SEBORRHEIC KERATOSIS: Primary | ICD-10-CM

## 2022-08-23 DIAGNOSIS — L84 CORN: ICD-10-CM

## 2022-08-23 DIAGNOSIS — L84 CALLUS: ICD-10-CM

## 2022-08-23 DIAGNOSIS — Z85.828 HISTORY OF NONMELANOMA SKIN CANCER: ICD-10-CM

## 2022-08-23 PROCEDURE — 17110 DESTRUCTION B9 LES UP TO 14: CPT | Performed by: DERMATOLOGY

## 2022-08-23 PROCEDURE — 99213 OFFICE O/P EST LOW 20 MIN: CPT | Mod: 25 | Performed by: DERMATOLOGY

## 2022-08-23 ASSESSMENT — PAIN SCALES - GENERAL: PAINLEVEL: NO PAIN (0)

## 2022-08-23 NOTE — PATIENT INSTRUCTIONS
For the corn/callus on the L foot:  Start over-the-counter salicylic acid pad (like Compound W).     Cryotherapy    What is it?  Use of a very cold liquid, such as liquid nitrogen, to freeze and destroy abnormal skin cells that need to be removed    What should I expect?  Tenderness and redness  A small blister that might grow and fill with dark purple blood. There may be crusting.  More than one treatment may be needed if the lesions do not go away.    How do I care for the treated area?  Gently wash the area with your hands when bathing.  Use a thin layer of Vaseline to help with healing. You may use a Band-Aid.   The area should heal within 7-10 days and may leave behind a pink or lighter color.   Do not use an antibiotic or Neosporin ointment.   You may take acetaminophen (Tylenol) for pain.     Call your doctor if you have:  Severe pain  Signs of infection (warmth, redness, cloudy yellow drainage, and or a bad smell)  Questions or concerns    Who should I call with questions?      [facility]: [phone]      [facility]: [phone]      For urgent needs outside of business hours call the [facility] at [phone] and ask for the dermatology resident on call

## 2022-08-23 NOTE — NURSING NOTE
Dermatology Rooming Note    Alvaro Partida's goals for this visit include:   Chief Complaint   Patient presents with     Derm Problem     Don is here for a few spots of concern.  Right ankle, left foot, and right shoulder.       Christy Smith, CMA

## 2022-08-23 NOTE — PROGRESS NOTES
[facility] Dermatology Note  Encounter Date: Aug 23, 2022  Office Visit     Dermatology Problem List:  1. Actinic Keratosis of left temporal region              -s/p cryotherapy 4/3/2019   2. Ruptured folliculitis, right anterior shoulder, biopsied 3/5/18  3. Jxnl nevus, left forearm, biopsy 4/3/2019  4. Dermatofibroma, right dorsal arm, bx 5/6/20   5. Seborrheic keratosis, irritated. On right forearm x3.               -s/p cryotherapy on 6/30/21  6. Possible porokeratosis of the L post ankle               - continue to use at home clobetasol as needed.   Social: wife in treatment for stage 4 melanoma (dx 10 yrs ago)  ____________________________________________    Assessment & Plan:    # Inflamed seborrheic keratoses, R shin x 2, R anterior shoulder x 1.  - Cryotherapy as below    # Corn. Acute, uncomplicated.  # Callus. Acute, uncomplicated.  - Recommend OTC salicyclic acid pads; recommended avoiding tight-fitting clothes.     # Hx NMSC.   - Sun precaution was advised including the use of sun screens of SPF 30 or higher, sun protective clothing, and avoidance of tanning beds.  - Follow-up in May 2023 for FBSE with Dr. Retana    Procedures Performed:   - Cryotherapy procedure note, location(s): as above. After verbal consent and discussion of risks and benefits including, but not limited to, dyspigmentation/scar, blister, and pain, 3 inflamed SK lesion(s) was(were) treated with 1-2 mm freeze border for 1-2 cycles with liquid nitrogen. Post cryotherapy instructions were provided.    Follow-up: 9 month(s) in-person, or earlier for new or changing lesions    Staff and Scribe:     Scribe Disclosure:  IRAMAN, am serving as a scribe to document services personally performed by Maximiliano Laughlin MD based on data collection and the provider's statements to me.     Provider Disclosure:   The documentation recorded by the scribe accurately reflects the services I personally performed and the  "decisions made by me.    Maximiliano Laughlin MD    Department of Dermatology  [facility]: Phone: [phone], Fax:[phone]  [facility]: Phone: [phone] Fax: [phone]  ____________________________________________    CC: Derm Problem (Don is here for a few spots of concern.  Right ankle, left foot, and right shoulder.  )    HPI:  Mr. Alvaro Partida is a(n) 62 year old male who presents today as a return patient for spot checks. Last seen by Dr. Retana on 22, at which time patient underwent cryotherapy for treatment of AKs on the left cheek and right ear helix.     Today, he reports a few areas of concern includin. Right shin- he reports two \"warty\" spot that were treated with cryotherapy by his PCP but not resolved completed. No pain, tenderness or bleeding. Mildly itchy.   2. Left foot. Reports area of skin thickening and tenderness on the lateral left lateral side of the plantar foot.   3. Right shoulder. Itchy brown spot. No pain, tenderness.     The patient otherwise denies any new or concerning lesions. No bleeding, painful, pruritic, or changing lesions. Health otherwise stable. No other skin concerns.     Labs Reviewed:  N/A    Physical Exam:  Vitals: There were no vitals taken for this visit.  SKIN: Focused examination of right shoulder, distal lower extremities was performed.  - Well-demarcated light brown, verrucous appearing papules on the right shin x 2, and right anterior shoulder x 1.  - Hyperkeratotic plaque with small central keratinous plug/invagination on the left lateral plantar foot  - No other lesions of concern on areas examined.     Medications:  Current Outpatient Medications   Medication     cetirizine (ZYRTEC) 10 MG tablet     fluticasone (FLONASE) 50 MCG/ACT nasal spray     Naproxen Sodium (ALEVE PO)     rosuvastatin (CRESTOR) 5 MG tablet     " ASPIRIN PO     loratadine (CLARITIN) 10 MG tablet     multivitamin w/minerals (THERA-VIT-M) tablet     Omega-3 Fatty Acids (OMEGA-3 FISH OIL PO)     No current facility-administered medications for this visit.      Past Medical History:   Patient Active Problem List   Diagnosis     Lichen simplex     Tubular adenoma of colon, 0.2cm [facility].     Seasonal allergic rhinitis due to pollen     Vasculogenic erectile dysfunction     Past Medical History:   Diagnosis Date     Elevated cholesterol      Seasonal allergies      Syncopal episodes 2006    EKG, CXR, CT, labs nl

## 2022-09-03 ENCOUNTER — HEALTH MAINTENANCE LETTER (OUTPATIENT)
Age: 62
End: 2022-09-03

## 2023-05-17 NOTE — PROGRESS NOTES
[facility] Dermatology Note  Encounter Date: May 18, 2023  Office Visit     Dermatology Problem List:  1. Actinic Keratosis              -s/p cryotherapy   2. Ruptured folliculitis, right anterior shoulder, biopsied 3/5/18  3. Jxnl nevus, left forearm, biopsy 4/3/2019  4. Dermatofibroma, right dorsal arm, bx 5/6/20   5. Seborrheic keratosis, irritated.               - left temporal region s/p cryotherapy on 6/30/21   -left ant thigh, s/p cryotherapy 5/18/23  6. Possible porokeratosis of the L post ankle               - continue to use at home clobetasol as needed.   Social: wife in treatment for stage 4 melanoma (dx 10 yrs ago)  ____________________________________________    Assessment & Plan:    # Inflamed seborrheic keratoss, L ant thigh, pruritic  - Cryotherapy performed today, see procedure note below    # AKs, scalp (4x)  - Cryotherapy performed today, see procedure note below     # Subungual hematoma, left 5th digit of foot  -Reassured patient of benign nature of lesion, no treatment necessary at this time     # Benign lesions: cherry angiomas, seborrheic keratoses, solar lentigines, benign nevi   - Sun precaution was advised including the use of sun screens of SPF 30 or higher, sun protective clothing, and avoidance of tanning beds.  - Follow-up in a year    Procedures Performed:   - Cryotherapy procedure note, location(s): scalp (4x), left ant thigh(1x). After verbal consent and discussion of risks and benefits including, but not limited to, dyspigmentation/scar, blister, and pain,  4 AK and 1 inflamed SK lesion(s) was(were) treated with 1-2 mm freeze border for 1-2 cycles with liquid nitrogen. Post cryotherapy instructions were provided.    Follow-up: 1 year(s) in-person, or earlier for new or changing lesions    Staff and Scribe:     Scribe Disclosure:  Jeremy MELLO, am serving as a scribe to document services personally performed by Maximiliano Laughlin MD based on data  collection and the provider's statements to me.     Brenda Jay, medical student, [facility]    Patient seen and staffed with Dr. Laughlin    Provider Disclosure:   The documentation recorded by the scribe accurately reflects the services I personally performed and the decisions made by me.    Staff Physician:  I was present with the medical student who participated in the service and in the documentation of the note. I have verified the history and personally performed the physical exam and medical decision making. I agree with the assessment and plan of care as documented in the note. The procedure(s) was(were) performed by myself.    Maximiliano Laughlin MD  Pronouns: he/him/his    Department of Dermatology  [facility]: Phone: [phone], Fax:[phone]  [facility]: Phone: [phone] Fax: [phone]  ____________________________________________    CC: Skin Check (Areas of concern includes a spot on upper L leg and a black toenail on L foot)    HPI:  Mr. Alvaro Partida is a(n) 62 year old male who presents today as a return patient for FBSE. Last seen by me on 8/23/2022, at which time patient underwent cryotherapy for treatment of inflamed seborrheic keratoses.    Today, he has a lesion of concern on his left anterior thigh, which has been pruritic and catches on clothes. Not tender or bleeding, noticed it a couple of months ago. He also noticed some of his left toenails turned black, which concerned him. These have mostly resolved, except for the 5th digit. He does a lot of walking for his work, thinks it may be trauma related.    No other spots of concern, no other pruritic, bleeding or tender lesions. He says he tries to apply sunscreen on his scalp, but does not frequently use a hat.    No personal or family history of skin cancers    Patient is otherwise feeling well,  without additional skin concerns.    Labs Reviewed:  N/A    Physical Exam:  Vitals: There were no vitals taken for this visit.  SKIN: Full skin, which includes the head/face, both arms, chest, back, abdomen,both legs, genitalia and/or groin buttocks, digits and/or nails, was examined.  - scattered red papules on trunk, back and extremities  -brown macule on the left temporal face  -scattered regular brown macules and papules on sun exposed skin  -tan, stuck on papule on the left ant thigh, overlying keratin  -black discoloration left 5th digit, with onycholysis   - No other lesions of concern on areas examined.     Medications:  Current Outpatient Medications   Medication     cetirizine (ZYRTEC) 10 MG tablet     fluticasone (FLONASE) 50 MCG/ACT nasal spray     Naproxen Sodium (ALEVE PO)     ASPIRIN PO     loratadine (CLARITIN) 10 MG tablet     multivitamin w/minerals (THERA-VIT-M) tablet     Omega-3 Fatty Acids (OMEGA-3 FISH OIL PO)     rosuvastatin (CRESTOR) 5 MG tablet     No current facility-administered medications for this visit.      Past Medical History:   Patient Active Problem List   Diagnosis     Lichen simplex     Tubular adenoma of colon, 0.2cm [facility].     Seasonal allergic rhinitis due to pollen     Vasculogenic erectile dysfunction     Past Medical History:   Diagnosis Date     Elevated cholesterol      Seasonal allergies      Syncopal episodes 2006    EKG, CXR, CT, labs nl

## 2023-05-18 ENCOUNTER — OFFICE VISIT (OUTPATIENT)
Dept: DERMATOLOGY | Facility: CLINIC | Age: 63
End: 2023-05-18
Payer: COMMERCIAL

## 2023-05-18 DIAGNOSIS — L82.1 SEBORRHEIC KERATOSIS: ICD-10-CM

## 2023-05-18 DIAGNOSIS — L82.0 INFLAMED SEBORRHEIC KERATOSIS: ICD-10-CM

## 2023-05-18 DIAGNOSIS — L57.0 ACTINIC KERATOSIS: Primary | ICD-10-CM

## 2023-05-18 DIAGNOSIS — D22.9 MULTIPLE BENIGN NEVI: ICD-10-CM

## 2023-05-18 DIAGNOSIS — L81.4 SOLAR LENTIGO: ICD-10-CM

## 2023-05-18 DIAGNOSIS — D18.01 CHERRY ANGIOMA: ICD-10-CM

## 2023-05-18 PROCEDURE — 17003 DESTRUCT PREMALG LES 2-14: CPT | Mod: XS | Performed by: DERMATOLOGY

## 2023-05-18 PROCEDURE — 17110 DESTRUCTION B9 LES UP TO 14: CPT | Performed by: DERMATOLOGY

## 2023-05-18 PROCEDURE — 17000 DESTRUCT PREMALG LESION: CPT | Mod: XS | Performed by: DERMATOLOGY

## 2023-05-18 PROCEDURE — 99213 OFFICE O/P EST LOW 20 MIN: CPT | Mod: 25 | Performed by: DERMATOLOGY

## 2023-05-18 ASSESSMENT — PAIN SCALES - GENERAL: PAINLEVEL: NO PAIN (0)

## 2023-05-18 NOTE — LETTER
5/18/2023       RE: Alvaro Partida  [address]  [place] [address]     Dear Colleague,    Thank you for referring your patient, Alvaro Partida, to the [facility] [place] at [facility]. Please see a copy of my visit note below.    [facility] Dermatology Note  Encounter Date: May 18, 2023  Office Visit     Dermatology Problem List:  1. Actinic Keratosis              -s/p cryotherapy   2. Ruptured folliculitis, right anterior shoulder, biopsied 3/5/18  3. Jxnl nevus, left forearm, biopsy 4/3/2019  4. Dermatofibroma, right dorsal arm, bx 5/6/20   5. Seborrheic keratosis, irritated.               - left temporal region s/p cryotherapy on 6/30/21   -left ant thigh, s/p cryotherapy 5/18/23  6. Possible porokeratosis of the L post ankle               - continue to use at home clobetasol as needed.   Social: wife in treatment for stage 4 melanoma (dx 10 yrs ago)  ____________________________________________    Assessment & Plan:    # Inflamed seborrheic keratoss, L ant thigh, pruritic  - Cryotherapy performed today, see procedure note below    # AKs, scalp (4x)  - Cryotherapy performed today, see procedure note below     # Subungual hematoma, left 5th digit of foot  -Reassured patient of benign nature of lesion, no treatment necessary at this time     # Benign lesions: cherry angiomas, seborrheic keratoses, solar lentigines, benign nevi   - Sun precaution was advised including the use of sun screens of SPF 30 or higher, sun protective clothing, and avoidance of tanning beds.  - Follow-up in a year    Procedures Performed:   - Cryotherapy procedure note, location(s): scalp (4x), left ant thigh(1x). After verbal consent and discussion of risks and benefits including, but not limited to, dyspigmentation/scar, blister, and pain,  4 AK and 1 inflamed SK lesion(s) was(were) treated with 1-2 mm freeze border for 1-2 cycles with  liquid nitrogen. Post cryotherapy instructions were provided.    Follow-up: 1 year(s) in-person, or earlier for new or changing lesions    Staff and Scribe:     Scribe Disclosure:  I, Jeremy Gann, am serving as a scribe to document services personally performed by Maximiliano Laughlin MD based on data collection and the provider's statements to me.     Brenda Jay, medical student, [employer] [employer] [facility]    Patient seen and staffed with Dr. Laughlin    Provider Disclosure:   The documentation recorded by the scribe accurately reflects the services I personally performed and the decisions made by me.    Staff Physician:  I was present with the medical student who participated in the service and in the documentation of the note. I have verified the history and personally performed the physical exam and medical decision making. I agree with the assessment and plan of care as documented in the note. The procedure(s) was(were) performed by myself.    Maximiliano Laughlin MD  Pronouns: he/him/his    Department of Dermatology  [facility]: Phone: [phone], Fax:[phone]  [facility]: Phone: [phone] Fax: [phone]  ____________________________________________    CC: Skin Check (Areas of concern includes a spot on upper L leg and a black toenail on L foot)    HPI:  Mr. Alvaro Partida is a(n) 62 year old male who presents today as a return patient for FBSE. Last seen by me on 8/23/2022, at which time patient underwent cryotherapy for treatment of inflamed seborrheic keratoses.    Today, he has a lesion of concern on his left anterior thigh, which has been pruritic and catches on clothes. Not tender or bleeding, noticed it a couple of months ago. He also noticed some of his left toenails turned black, which concerned him. These have mostly resolved, except for the 5th digit. He does a lot of  walking for his work, thinks it may be trauma related.    No other spots of concern, no other pruritic, bleeding or tender lesions. He says he tries to apply sunscreen on his scalp, but does not frequently use a hat.    No personal or family history of skin cancers    Patient is otherwise feeling well, without additional skin concerns.    Labs Reviewed:  N/A    Physical Exam:  Vitals: There were no vitals taken for this visit.  SKIN: Full skin, which includes the head/face, both arms, chest, back, abdomen,both legs, genitalia and/or groin buttocks, digits and/or nails, was examined.  - scattered red papules on trunk, back and extremities  -brown macule on the left temporal face  -scattered regular brown macules and papules on sun exposed skin  -tan, stuck on papule on the left ant thigh, overlying keratin  -black discoloration left 5th digit, with onycholysis   - No other lesions of concern on areas examined.     Medications:  Current Outpatient Medications   Medication    cetirizine (ZYRTEC) 10 MG tablet    fluticasone (FLONASE) 50 MCG/ACT nasal spray    Naproxen Sodium (ALEVE PO)    ASPIRIN PO    loratadine (CLARITIN) 10 MG tablet    multivitamin w/minerals (THERA-VIT-M) tablet    Omega-3 Fatty Acids (OMEGA-3 FISH OIL PO)    rosuvastatin (CRESTOR) 5 MG tablet     No current facility-administered medications for this visit.      Past Medical History:   Patient Active Problem List   Diagnosis    Lichen simplex    Tubular adenoma of colon, 0.2cm [facility].    Seasonal allergic rhinitis due to pollen    Vasculogenic erectile dysfunction     Past Medical History:   Diagnosis Date    Elevated cholesterol     Seasonal allergies     Syncopal episodes 2006    EKG, CXR, CT, labs nl

## 2023-05-18 NOTE — NURSING NOTE
Chief Complaint   Patient presents with     Skin Check     Areas of concern includes a spot on upper L left and a black toenail on L foot     Yusef Moya, EMT

## 2023-06-03 ENCOUNTER — HEALTH MAINTENANCE LETTER (OUTPATIENT)
Age: 63
End: 2023-06-03

## 2024-07-06 ENCOUNTER — HEALTH MAINTENANCE LETTER (OUTPATIENT)
Age: 64
End: 2024-07-06

## 2024-07-10 ENCOUNTER — OFFICE VISIT (OUTPATIENT)
Dept: URGENT CARE | Facility: URGENT CARE | Age: 64
End: 2024-07-10
Payer: COMMERCIAL

## 2024-07-10 VITALS
SYSTOLIC BLOOD PRESSURE: 111 MMHG | OXYGEN SATURATION: 95 % | HEART RATE: 104 BPM | DIASTOLIC BLOOD PRESSURE: 73 MMHG | TEMPERATURE: 97.6 F

## 2024-07-10 DIAGNOSIS — B02.9 HERPES ZOSTER WITHOUT COMPLICATION: Primary | ICD-10-CM

## 2024-07-10 PROCEDURE — 99203 OFFICE O/P NEW LOW 30 MIN: CPT | Performed by: FAMILY MEDICINE

## 2024-07-10 RX ORDER — TADALAFIL 5 MG/1
TABLET ORAL
COMMUNITY
Start: 2024-06-08

## 2024-07-10 RX ORDER — VALACYCLOVIR HYDROCHLORIDE 1 G/1
1000 TABLET, FILM COATED ORAL 3 TIMES DAILY
Qty: 21 TABLET | Refills: 0 | Status: SHIPPED | OUTPATIENT
Start: 2024-07-10 | End: 2024-07-17

## 2024-07-10 NOTE — PATIENT INSTRUCTIONS
Take Valtrex three times daily for 7 days to treat your shingles outbreak.  Use ibuprofen or Tylenol as needed to help with pain and you can add some Benadryl on top of the cetirizine to help with itching.    Ibuprofen 600 mg three times daily.  Use Tylenol between ibuprofen doses for additional pain control.

## 2024-07-10 NOTE — PROGRESS NOTES
ICD-10-CM    1. Herpes zoster without complication  B02.9 valACYclovir (VALTREX) 1000 mg tablet        No evidence of secondary bacterial infection of any lesions.    PLAN:  Patient Instructions   Take Valtrex three times daily for 7 days to treat your shingles outbreak.  Use ibuprofen or Tylenol as needed to help with pain and you can add some Benadryl on top of the cetirizine to help with itching.    Ibuprofen 600 mg three times daily.  Use Tylenol between ibuprofen doses for additional pain control.    SUBJECTIVE:  Alvaro Partida is a 63 year old male who presents to  today with a L sided rash that started on Sunday.  Painful and some itching.  No fever.  Rash started on back and wraps around L flank to L lower chest.    OBJECTIVE:  /73 (BP Location: Right arm, Patient Position: Sitting, Cuff Size: Adult Large)   Pulse 104   Temp 97.6  F (36.4  C) (Tympanic)   SpO2 95%   GEN: well-appearing, in NAD  Skin:  L back, flank and chest with scattered areas of vesicular lesions on erythematous base c/w shingles, likely T6 or T7 dermatome.  No crusting.

## 2024-08-15 ENCOUNTER — OFFICE VISIT (OUTPATIENT)
Dept: DERMATOLOGY | Facility: CLINIC | Age: 64
End: 2024-08-15
Payer: COMMERCIAL

## 2024-08-15 DIAGNOSIS — Z12.83 SKIN CANCER SCREENING: Primary | ICD-10-CM

## 2024-08-15 DIAGNOSIS — L57.0 ACTINIC KERATOSIS: ICD-10-CM

## 2024-08-15 DIAGNOSIS — B07.9 VERRUCA VULGARIS: ICD-10-CM

## 2024-08-15 PROCEDURE — 17003 DESTRUCT PREMALG LES 2-14: CPT | Mod: XS | Performed by: DERMATOLOGY

## 2024-08-15 PROCEDURE — 17000 DESTRUCT PREMALG LESION: CPT | Mod: XS | Performed by: DERMATOLOGY

## 2024-08-15 PROCEDURE — 99213 OFFICE O/P EST LOW 20 MIN: CPT | Mod: 25 | Performed by: DERMATOLOGY

## 2024-08-15 PROCEDURE — 17110 DESTRUCTION B9 LES UP TO 14: CPT | Mod: GC | Performed by: DERMATOLOGY

## 2024-08-15 ASSESSMENT — PAIN SCALES - GENERAL: PAINLEVEL: NO PAIN (0)

## 2024-08-15 NOTE — NURSING NOTE
Dermatology Rooming Note    Alvaro Partida's goals for this visit include:   Chief Complaint   Patient presents with    Skin Check     FBSE  Top of head     Soha Garcia LPN

## 2024-08-15 NOTE — PROGRESS NOTES
[facility] Dermatology Note  Encounter Date: Aug 15, 2024  Office Visit     Dermatology Problem List:  1. Actinic Keratosis              -s/p cryotherapy   2. Ruptured folliculitis, right anterior shoulder, biopsied 3/5/18  3. Jxnl nevus, left forearm, biopsy 4/3/2019  4. Dermatofibroma, right dorsal arm, bx 5/6/20   5. Seborrheic keratosis, irritated.               - left temporal region s/p cryotherapy on 6/30/21   -left ant thigh, s/p cryotherapy 5/18/23  6. Verruca Vulgaris, R inferior shin  - s/p cryotherapy 8/15/2024    Social: wife in treatment for stage 4 melanoma (dx 10 yrs ago)  ____________________________________________    Assessment & Plan:  # AKs, scalp (3x)  - Cryotherapy performed today, see procedure note below    #Verruca Vulgaris  - Cryotherapy performed today, see procedure note below     # Benign lesions: cherry angiomas, seborrheic keratoses, solar lentigines, benign nevi   - Sun precaution was advised including the use of sun screens of SPF 30 or higher, sun protective clothing, and avoidance of tanning beds.  - Follow-up in a year    Procedures Performed:   - Cryotherapy procedure note, location(s): scalp (3x). After verbal consent and discussion of risks and benefits including, but not limited to, dyspigmentation/scar, blister, and pain,  3 AK(s) was(were) treated with 1-2 mm freeze border for 1-2 cycles with liquid nitrogen. Post cryotherapy instructions were provided.    Follow-up: 1 year(s) in-person, or earlier for new or changing lesions    Staff and Resident:    Pollo Osorio MD,  Dermatology Resident, PGY2      Staff Physician:  Dr. Lainey MELLO was present for the entire procedure. Mary Lou Retana MD   I, Mary Lou Retana MD, saw this patient with the resident and agree with the resident s findings and plan of care as documented in the resident s note.    ____________________________________________    CC: Skin Check (FBSE/Top of head)    HPI:    Alvaro Partida is a(n) 64 year old male who presents today as a return patient for FBSE. Last seen by Dr. Laughlin 5/18/2023 at which time he received cryotherapy for Aks and Sks on the left anterior thigh and scalp.    Today, has no specific spots of concern today, but thinks he has a few spots on the scalp that are scaly and have been frozen in the past. Of note, he had shingles in July that are healing well with a bit of residual pain.     Patient is otherwise feeling well, without additional skin concerns.    Labs Reviewed:  N/A    Physical Exam:  Vitals: There were no vitals taken for this visit.  SKIN: Full skin, which includes the head/face, both arms, chest, back, abdomen,both legs, genitalia and/or groin buttocks, digits and/or nails, was examined.  - scattered red papules on trunk, back and extremities  -brown macule on the left temporal face  -scattered regular brown macules and papules on sun exposed skin  - gritty thin papules on the frontal and vertex scalp  - plaque with atrophic lightened center with surrounding 2-3 verrucous papules on the periphery  - pink macules coalescing in patch on the L midback, similar smaller macule on the L inframammary fold  - No other lesions of concern on areas examined.     Medications:  Current Outpatient Medications   Medication Sig Dispense Refill    cetirizine (ZYRTEC) 10 MG tablet Take 10 mg by mouth      fluticasone (FLONASE) 50 MCG/ACT nasal spray   1    Naproxen Sodium (ALEVE PO)       rosuvastatin (CRESTOR) 5 MG tablet       tadalafil (CIALIS) 5 MG tablet       ASPIRIN PO Take 81 mg by mouth (Patient not taking: Reported on 5/9/2022)      loratadine (CLARITIN) 10 MG tablet Take 10 mg by mouth daily (Patient not taking: Reported on 5/9/2022)      multivitamin w/minerals (THERA-VIT-M) tablet Take 1 tablet by mouth daily (Patient not taking: Reported on 5/9/2022)      Omega-3 Fatty Acids (OMEGA-3 FISH OIL PO) Take 1 g by mouth daily (Patient not taking: Reported on  5/9/2022)      valACYclovir (VALTREX) 1000 mg tablet Take 1 tablet (1,000 mg) by mouth 3 times daily for 7 days 21 tablet 0     No current facility-administered medications for this visit.      Past Medical History:   Patient Active Problem List   Diagnosis    Lichen simplex    Tubular adenoma of colon, 0.2cm [facility].    Seasonal allergic rhinitis due to pollen    Vasculogenic erectile dysfunction     Past Medical History:   Diagnosis Date    Elevated cholesterol     Seasonal allergies     Syncopal episodes 2006    EKG, CXR, CT, labs nl

## 2024-08-15 NOTE — LETTER
8/15/2024       RE: Alvaro Partida  [address] [place] [facility] [place] [address]     Dear Colleague,    Thank you for referring your patient, Alvaro Partida, to the [facility] [place] at [facility]. Please see a copy of my visit note below.    [facility] Dermatology Note  Encounter Date: Aug 15, 2024  Office Visit     Dermatology Problem List:  1. Actinic Keratosis              -s/p cryotherapy   2. Ruptured folliculitis, right anterior shoulder, biopsied 3/5/18  3. Jxnl nevus, left forearm, biopsy 4/3/2019  4. Dermatofibroma, right dorsal arm, bx 5/6/20   5. Seborrheic keratosis, irritated.               - left temporal region s/p cryotherapy on 6/30/21   -left ant thigh, s/p cryotherapy 5/18/23  6. Verruca Vulgaris, R inferior shin  - s/p cryotherapy 8/15/2024    Social: wife in treatment for stage 4 melanoma (dx 10 yrs ago)  ____________________________________________    Assessment & Plan:  # AKs, scalp (3x)  - Cryotherapy performed today, see procedure note below    #Verruca Vulgaris  - Cryotherapy performed today, see procedure note below     # Benign lesions: cherry angiomas, seborrheic keratoses, solar lentigines, benign nevi   - Sun precaution was advised including the use of sun screens of SPF 30 or higher, sun protective clothing, and avoidance of tanning beds.  - Follow-up in a year    Procedures Performed:   - Cryotherapy procedure note, location(s): scalp (3x). After verbal consent and discussion of risks and benefits including, but not limited to, dyspigmentation/scar, blister, and pain,  3 AK(s) was(were) treated with 1-2 mm freeze border for 1-2 cycles with liquid nitrogen. Post cryotherapy instructions were provided.    Follow-up: 1 year(s) in-person, or earlier for new or changing lesions    Staff and Resident:    Pollo Osorio MD,  Dermatology Resident, PGY2      Staff Physician:  Dr. Lainey MELLO  was present for the entire procedure. Mary Lou GARCIA I, Mary Lou Retana MD, saw this patient with the resident and agree with the resident s findings and plan of care as documented in the resident s note.    ____________________________________________    CC: Skin Check (FBSE/Top of head)    HPI:  Mr. Alvaro Partida is a(n) 64 year old male who presents today as a return patient for FBSE. Last seen by Dr. Laughlin 5/18/2023 at which time he received cryotherapy for Aks and Sks on the left anterior thigh and scalp.    Today, has no specific spots of concern today, but thinks he has a few spots on the scalp that are scaly and have been frozen in the past. Of note, he had shingles in July that are healing well with a bit of residual pain.     Patient is otherwise feeling well, without additional skin concerns.    Labs Reviewed:  N/A    Physical Exam:  Vitals: There were no vitals taken for this visit.  SKIN: Full skin, which includes the head/face, both arms, chest, back, abdomen,both legs, genitalia and/or groin buttocks, digits and/or nails, was examined.  - scattered red papules on trunk, back and extremities  -brown macule on the left temporal face  -scattered regular brown macules and papules on sun exposed skin  - gritty thin papules on the frontal and vertex scalp  - plaque with atrophic lightened center with surrounding 2-3 verrucous papules on the periphery  - pink macules coalescing in patch on the L midback, similar smaller macule on the L inframammary fold  - No other lesions of concern on areas examined.     Medications:  Current Outpatient Medications   Medication Sig Dispense Refill     cetirizine (ZYRTEC) 10 MG tablet Take 10 mg by mouth       fluticasone (FLONASE) 50 MCG/ACT nasal spray   1     Naproxen Sodium (ALEVE PO)        rosuvastatin (CRESTOR) 5 MG tablet        tadalafil (CIALIS) 5 MG tablet        ASPIRIN PO Take 81 mg by mouth (Patient not taking: Reported on 5/9/2022)       loratadine  (CLARITIN) 10 MG tablet Take 10 mg by mouth daily (Patient not taking: Reported on 5/9/2022)       multivitamin w/minerals (THERA-VIT-M) tablet Take 1 tablet by mouth daily (Patient not taking: Reported on 5/9/2022)       Omega-3 Fatty Acids (OMEGA-3 FISH OIL PO) Take 1 g by mouth daily (Patient not taking: Reported on 5/9/2022)       valACYclovir (VALTREX) 1000 mg tablet Take 1 tablet (1,000 mg) by mouth 3 times daily for 7 days 21 tablet 0     No current facility-administered medications for this visit.      Past Medical History:   Patient Active Problem List   Diagnosis     Lichen simplex     Tubular adenoma of colon, 0.2cm [facility].     Seasonal allergic rhinitis due to pollen     Vasculogenic erectile dysfunction     Past Medical History:   Diagnosis Date     Elevated cholesterol      Seasonal allergies      Syncopal episodes 2006    EKG, CXR, CT, labs nl            Again, thank you for allowing me to participate in the care of your patient.      Sincerely,    Pollo Osorio MD

## 2025-02-12 ENCOUNTER — TELEPHONE (OUTPATIENT)
Dept: DERMATOLOGY | Facility: CLINIC | Age: 65
End: 2025-02-12
Payer: COMMERCIAL

## 2025-02-12 NOTE — TELEPHONE ENCOUNTER
Patient confirmed scheduled appointment:  Date: 9/9/25  Time: 8:15am  Visit type: Return Dermatology  Provider: Lainey  Location: [facility]  Testing/imaging: na  Additional notes: na

## 2025-07-13 ENCOUNTER — HEALTH MAINTENANCE LETTER (OUTPATIENT)
Age: 65
End: 2025-07-13